# Patient Record
Sex: MALE | Race: WHITE | NOT HISPANIC OR LATINO | Employment: OTHER | URBAN - METROPOLITAN AREA
[De-identification: names, ages, dates, MRNs, and addresses within clinical notes are randomized per-mention and may not be internally consistent; named-entity substitution may affect disease eponyms.]

---

## 2019-08-14 ENCOUNTER — HOSPITAL ENCOUNTER (OUTPATIENT)
Dept: OTHER | Facility: HOSPITAL | Age: 59
Discharge: HOME | End: 2019-08-14
Attending: FAMILY MEDICINE

## 2019-08-14 ENCOUNTER — OFFICE VISIT CONVERTED (OUTPATIENT)
Dept: FAMILY MEDICINE CLINIC | Age: 59
End: 2019-08-14
Attending: FAMILY MEDICINE

## 2019-08-14 LAB
ALBUMIN SERPL-MCNC: 4.1 G/DL (ref 3.5–5)
ALBUMIN/GLOB SERPL: 1.5 {RATIO} (ref 1.4–2.6)
ALP SERPL-CCNC: 145 U/L (ref 56–119)
ALT SERPL-CCNC: 13 U/L (ref 10–40)
ANION GAP SERPL CALC-SCNC: 19 MMOL/L (ref 8–19)
AST SERPL-CCNC: 12 U/L (ref 15–50)
BASOPHILS # BLD MANUAL: 0.07 10*3/UL (ref 0–0.2)
BASOPHILS NFR BLD MANUAL: 0.7 % (ref 0–3)
BILIRUB SERPL-MCNC: 0.36 MG/DL (ref 0.2–1.3)
BUN SERPL-MCNC: 14 MG/DL (ref 5–25)
BUN/CREAT SERPL: 17 {RATIO} (ref 6–20)
CALCIUM SERPL-MCNC: 9.7 MG/DL (ref 8.7–10.4)
CHLORIDE SERPL-SCNC: 105 MMOL/L (ref 99–111)
CHOLEST SERPL-MCNC: 214 MG/DL (ref 107–200)
CHOLEST/HDLC SERPL: 6.1 {RATIO} (ref 3–6)
CONV CO2: 24 MMOL/L (ref 22–32)
CONV TOTAL PROTEIN: 6.9 G/DL (ref 6.3–8.2)
CREAT UR-MCNC: 0.83 MG/DL (ref 0.7–1.2)
DEPRECATED RDW RBC AUTO: 39.1 FL
EOSINOPHIL # BLD MANUAL: 0.94 10*3/UL (ref 0–0.7)
EOSINOPHIL NFR BLD MANUAL: 9 % (ref 0–7)
ERYTHROCYTE [DISTWIDTH] IN BLOOD BY AUTOMATED COUNT: 13 % (ref 11.5–14.5)
GFR SERPLBLD BASED ON 1.73 SQ M-ARVRAT: >60 ML/MIN/{1.73_M2}
GLOBULIN UR ELPH-MCNC: 2.8 G/DL (ref 2–3.5)
GLUCOSE SERPL-MCNC: 248 MG/DL (ref 70–99)
GRANS (ABSOLUTE): 6.93 10*3/UL (ref 2–8)
GRANS: 66.1 % (ref 30–85)
HBA1C MFR BLD: 16.3 G/DL (ref 14–18)
HCT VFR BLD AUTO: 47 % (ref 42–52)
HDLC SERPL-MCNC: 35 MG/DL (ref 40–60)
IMM GRANULOCYTES # BLD: 0.02 10*3/UL (ref 0–0.54)
IMM GRANULOCYTES NFR BLD: 0.2 % (ref 0–0.43)
LDLC SERPL CALC-MCNC: 154 MG/DL (ref 70–100)
LYMPHOCYTES # BLD MANUAL: 2.03 10*3/UL (ref 1–5)
LYMPHOCYTES NFR BLD MANUAL: 4.6 % (ref 3–10)
MCH RBC QN AUTO: 28.5 PG (ref 27–31)
MCHC RBC AUTO-ENTMCNC: 34.7 G/DL (ref 33–37)
MCV RBC AUTO: 82.3 FL (ref 80–96)
MONOCYTES # BLD AUTO: 0.48 10*3/UL (ref 0.2–1.2)
OSMOLALITY SERPL CALC.SUM OF ELEC: 305 MOSM/KG (ref 273–304)
PLATELET # BLD AUTO: 272 10*3/UL (ref 130–400)
PMV BLD AUTO: 8.9 FL (ref 7.4–10.4)
POTASSIUM SERPL-SCNC: 4.7 MMOL/L (ref 3.5–5.3)
PSA SERPL-MCNC: 4.13 NG/ML (ref 0–4)
RBC # BLD AUTO: 5.71 10*6/UL (ref 4.7–6.1)
SODIUM SERPL-SCNC: 143 MMOL/L (ref 135–147)
TRIGL SERPL-MCNC: 123 MG/DL (ref 40–150)
TSH SERPL-ACNC: 1.79 M[IU]/L (ref 0.27–4.2)
VARIANT LYMPHS NFR BLD MANUAL: 19.4 % (ref 20–45)
VLDLC SERPL-MCNC: 25 MG/DL (ref 5–37)
WBC # BLD AUTO: 10.47 10*3/UL (ref 4.8–10.8)

## 2019-08-16 LAB
EST. AVERAGE GLUCOSE BLD GHB EST-MCNC: 194 MG/DL
HBA1C MFR BLD: 8.4 % (ref 3.5–5.7)

## 2019-08-21 ENCOUNTER — OFFICE VISIT CONVERTED (OUTPATIENT)
Dept: FAMILY MEDICINE CLINIC | Age: 59
End: 2019-08-21
Attending: FAMILY MEDICINE

## 2019-08-28 ENCOUNTER — HOSPITAL ENCOUNTER (OUTPATIENT)
Dept: OTHER | Facility: HOSPITAL | Age: 59
Discharge: HOME | End: 2019-08-28
Attending: FAMILY MEDICINE

## 2019-08-28 ENCOUNTER — OFFICE VISIT CONVERTED (OUTPATIENT)
Dept: FAMILY MEDICINE CLINIC | Age: 59
End: 2019-08-28
Attending: FAMILY MEDICINE

## 2019-08-28 LAB
ALBUMIN SERPL-MCNC: 4.3 G/DL (ref 3.5–5)
ALBUMIN/GLOB SERPL: 1.6 {RATIO} (ref 1.4–2.6)
ALP SERPL-CCNC: 143 U/L (ref 56–119)
ALT SERPL-CCNC: 15 U/L (ref 10–40)
ANION GAP SERPL CALC-SCNC: 19 MMOL/L (ref 8–19)
AST SERPL-CCNC: 16 U/L (ref 15–50)
BASOPHILS # BLD MANUAL: 0.08 10*3/UL (ref 0–0.2)
BASOPHILS NFR BLD MANUAL: 0.8 % (ref 0–3)
BILIRUB SERPL-MCNC: 0.31 MG/DL (ref 0.2–1.3)
BUN SERPL-MCNC: 16 MG/DL (ref 5–25)
BUN/CREAT SERPL: 19 {RATIO} (ref 6–20)
CALCIUM SERPL-MCNC: 9.8 MG/DL (ref 8.7–10.4)
CHLORIDE SERPL-SCNC: 106 MMOL/L (ref 99–111)
CONV CO2: 23 MMOL/L (ref 22–32)
CONV TOTAL PROTEIN: 7 G/DL (ref 6.3–8.2)
CREAT UR-MCNC: 0.83 MG/DL (ref 0.7–1.2)
DEPRECATED RDW RBC AUTO: 39.9 FL
EOSINOPHIL # BLD MANUAL: 1.04 10*3/UL (ref 0–0.7)
EOSINOPHIL NFR BLD MANUAL: 10.7 % (ref 0–7)
ERYTHROCYTE [DISTWIDTH] IN BLOOD BY AUTOMATED COUNT: 13 % (ref 11.5–14.5)
GFR SERPLBLD BASED ON 1.73 SQ M-ARVRAT: >60 ML/MIN/{1.73_M2}
GLOBULIN UR ELPH-MCNC: 2.7 G/DL (ref 2–3.5)
GLUCOSE SERPL-MCNC: 149 MG/DL (ref 70–99)
GRANS (ABSOLUTE): 5.97 10*3/UL (ref 2–8)
GRANS: 61.4 % (ref 30–85)
HBA1C MFR BLD: 16.4 G/DL (ref 14–18)
HCT VFR BLD AUTO: 48.1 % (ref 42–52)
IMM GRANULOCYTES # BLD: 0.02 10*3/UL (ref 0–0.54)
IMM GRANULOCYTES NFR BLD: 0.2 % (ref 0–0.43)
LYMPHOCYTES # BLD MANUAL: 2.15 10*3/UL (ref 1–5)
LYMPHOCYTES NFR BLD MANUAL: 4.8 % (ref 3–10)
MCH RBC QN AUTO: 28.3 PG (ref 27–31)
MCHC RBC AUTO-ENTMCNC: 34.1 G/DL (ref 33–37)
MCV RBC AUTO: 82.9 FL (ref 80–96)
MONOCYTES # BLD AUTO: 0.47 10*3/UL (ref 0.2–1.2)
OSMOLALITY SERPL CALC.SUM OF ELEC: 302 MOSM/KG (ref 273–304)
PLATELET # BLD AUTO: 301 10*3/UL (ref 130–400)
PMV BLD AUTO: 9.1 FL (ref 7.4–10.4)
POTASSIUM SERPL-SCNC: 4.4 MMOL/L (ref 3.5–5.3)
RBC # BLD AUTO: 5.8 10*6/UL (ref 4.7–6.1)
SODIUM SERPL-SCNC: 144 MMOL/L (ref 135–147)
VARIANT LYMPHS NFR BLD MANUAL: 22.1 % (ref 20–45)
WBC # BLD AUTO: 9.73 10*3/UL (ref 4.8–10.8)

## 2019-08-30 ENCOUNTER — HOSPITAL ENCOUNTER (OUTPATIENT)
Dept: OTHER | Facility: HOSPITAL | Age: 59
Discharge: HOME | End: 2019-08-30
Attending: FAMILY MEDICINE

## 2019-09-04 ENCOUNTER — OFFICE VISIT CONVERTED (OUTPATIENT)
Dept: FAMILY MEDICINE CLINIC | Age: 59
End: 2019-09-04
Attending: FAMILY MEDICINE

## 2019-09-04 ENCOUNTER — HOSPITAL ENCOUNTER (OUTPATIENT)
Dept: OTHER | Facility: HOSPITAL | Age: 59
Discharge: HOME | End: 2019-09-04
Attending: FAMILY MEDICINE

## 2019-09-12 ENCOUNTER — HOSPITAL ENCOUNTER (OUTPATIENT)
Dept: OTHER | Facility: HOSPITAL | Age: 59
Discharge: HOME | End: 2019-09-12
Attending: FAMILY MEDICINE

## 2019-09-12 ENCOUNTER — OFFICE VISIT CONVERTED (OUTPATIENT)
Dept: FAMILY MEDICINE CLINIC | Age: 59
End: 2019-09-12
Attending: FAMILY MEDICINE

## 2019-09-12 LAB
ALBUMIN SERPL-MCNC: 4 G/DL (ref 3.5–5)
ALBUMIN/GLOB SERPL: 1.4 {RATIO} (ref 1.4–2.6)
ALP SERPL-CCNC: 161 U/L (ref 56–119)
ALT SERPL-CCNC: 13 U/L (ref 10–40)
ANION GAP SERPL CALC-SCNC: 18 MMOL/L (ref 8–19)
AST SERPL-CCNC: 14 U/L (ref 15–50)
BASOPHILS # BLD MANUAL: 0.08 10*3/UL (ref 0–0.2)
BASOPHILS NFR BLD MANUAL: 0.7 % (ref 0–3)
BILIRUB SERPL-MCNC: 0.44 MG/DL (ref 0.2–1.3)
BUN SERPL-MCNC: 17 MG/DL (ref 5–25)
BUN/CREAT SERPL: 22 {RATIO} (ref 6–20)
CALCIUM SERPL-MCNC: 9.6 MG/DL (ref 8.7–10.4)
CHLORIDE SERPL-SCNC: 105 MMOL/L (ref 99–111)
CONV CO2: 24 MMOL/L (ref 22–32)
CONV TOTAL PROTEIN: 6.9 G/DL (ref 6.3–8.2)
CREAT UR-MCNC: 0.78 MG/DL (ref 0.7–1.2)
DEPRECATED RDW RBC AUTO: 39.1 FL
EOSINOPHIL # BLD MANUAL: 0.98 10*3/UL (ref 0–0.7)
EOSINOPHIL NFR BLD MANUAL: 8.5 % (ref 0–7)
ERYTHROCYTE [DISTWIDTH] IN BLOOD BY AUTOMATED COUNT: 12.4 % (ref 11.5–14.5)
GFR SERPLBLD BASED ON 1.73 SQ M-ARVRAT: >60 ML/MIN/{1.73_M2}
GLOBULIN UR ELPH-MCNC: 2.9 G/DL (ref 2–3.5)
GLUCOSE SERPL-MCNC: 120 MG/DL (ref 70–99)
GRANS (ABSOLUTE): 7.65 10*3/UL (ref 2–8)
GRANS: 66.4 % (ref 30–85)
HBA1C MFR BLD: 14.4 G/DL (ref 14–18)
HCT VFR BLD AUTO: 43 % (ref 42–52)
IMM GRANULOCYTES # BLD: 0.02 10*3/UL (ref 0–0.54)
IMM GRANULOCYTES NFR BLD: 0.2 % (ref 0–0.43)
LYMPHOCYTES # BLD MANUAL: 2.17 10*3/UL (ref 1–5)
LYMPHOCYTES NFR BLD MANUAL: 5.3 % (ref 3–10)
MCH RBC QN AUTO: 28.4 PG (ref 27–31)
MCHC RBC AUTO-ENTMCNC: 33.5 G/DL (ref 33–37)
MCV RBC AUTO: 84.8 FL (ref 80–96)
MONOCYTES # BLD AUTO: 0.61 10*3/UL (ref 0.2–1.2)
OSMOLALITY SERPL CALC.SUM OF ELEC: 299 MOSM/KG (ref 273–304)
PLATELET # BLD AUTO: 338 10*3/UL (ref 130–400)
PMV BLD AUTO: 8.6 FL (ref 7.4–10.4)
POTASSIUM SERPL-SCNC: 4.3 MMOL/L (ref 3.5–5.3)
RBC # BLD AUTO: 5.07 10*6/UL (ref 4.7–6.1)
SODIUM SERPL-SCNC: 143 MMOL/L (ref 135–147)
VARIANT LYMPHS NFR BLD MANUAL: 18.9 % (ref 20–45)
WBC # BLD AUTO: 11.51 10*3/UL (ref 4.8–10.8)

## 2019-09-14 LAB
PSA FREE MFR SERPL: 17.9 %
PSA FREE SERPL-MCNC: 0.75 NG/ML
PSA SERPL-MCNC: 4.2 NG/ML (ref 0–4)

## 2019-09-17 ENCOUNTER — HOSPITAL ENCOUNTER (OUTPATIENT)
Dept: CARDIOLOGY | Facility: HOSPITAL | Age: 59
Discharge: HOME | End: 2019-09-17

## 2019-09-26 ENCOUNTER — OFFICE VISIT CONVERTED (OUTPATIENT)
Dept: FAMILY MEDICINE CLINIC | Age: 59
End: 2019-09-26
Attending: FAMILY MEDICINE

## 2019-10-25 ENCOUNTER — OFFICE VISIT CONVERTED (OUTPATIENT)
Dept: FAMILY MEDICINE CLINIC | Age: 59
End: 2019-10-25
Attending: FAMILY MEDICINE

## 2019-10-25 ENCOUNTER — HOSPITAL ENCOUNTER (OUTPATIENT)
Dept: OTHER | Facility: HOSPITAL | Age: 59
Discharge: HOME | End: 2019-10-25
Attending: FAMILY MEDICINE

## 2019-10-25 LAB
ALBUMIN SERPL-MCNC: 4.4 G/DL (ref 3.5–5)
ALBUMIN/GLOB SERPL: 1.5 {RATIO} (ref 1.4–2.6)
ALP SERPL-CCNC: 162 U/L (ref 56–119)
ALT SERPL-CCNC: 25 U/L (ref 10–40)
ANION GAP SERPL CALC-SCNC: 20 MMOL/L (ref 8–19)
AST SERPL-CCNC: 26 U/L (ref 15–50)
BASOPHILS # BLD MANUAL: 0.08 10*3/UL (ref 0–0.2)
BASOPHILS NFR BLD MANUAL: 0.7 % (ref 0–3)
BILIRUB SERPL-MCNC: 0.38 MG/DL (ref 0.2–1.3)
BUN SERPL-MCNC: 18 MG/DL (ref 5–25)
BUN/CREAT SERPL: 26 {RATIO} (ref 6–20)
CALCIUM SERPL-MCNC: 9.9 MG/DL (ref 8.7–10.4)
CHLORIDE SERPL-SCNC: 105 MMOL/L (ref 99–111)
CONV CO2: 22 MMOL/L (ref 22–32)
CONV TOTAL PROTEIN: 7.3 G/DL (ref 6.3–8.2)
CREAT UR-MCNC: 0.7 MG/DL (ref 0.7–1.2)
DEPRECATED RDW RBC AUTO: 42.6 FL
EOSINOPHIL # BLD MANUAL: 1.14 10*3/UL (ref 0–0.7)
EOSINOPHIL NFR BLD MANUAL: 10 % (ref 0–7)
ERYTHROCYTE [DISTWIDTH] IN BLOOD BY AUTOMATED COUNT: 13.2 % (ref 11.5–14.5)
GFR SERPLBLD BASED ON 1.73 SQ M-ARVRAT: >60 ML/MIN/{1.73_M2}
GLOBULIN UR ELPH-MCNC: 2.9 G/DL (ref 2–3.5)
GLUCOSE SERPL-MCNC: 116 MG/DL (ref 70–99)
GRANS (ABSOLUTE): 7.53 10*3/UL (ref 2–8)
GRANS: 66.2 % (ref 30–85)
HBA1C MFR BLD: 14.8 G/DL (ref 14–18)
HCT VFR BLD AUTO: 44.9 % (ref 42–52)
IMM GRANULOCYTES # BLD: 0.01 10*3/UL (ref 0–0.54)
IMM GRANULOCYTES NFR BLD: 0.1 % (ref 0–0.43)
LYMPHOCYTES # BLD MANUAL: 2.26 10*3/UL (ref 1–5)
LYMPHOCYTES NFR BLD MANUAL: 3.2 % (ref 3–10)
MCH RBC QN AUTO: 28.5 PG (ref 27–31)
MCHC RBC AUTO-ENTMCNC: 33 G/DL (ref 33–37)
MCV RBC AUTO: 86.5 FL (ref 80–96)
MONOCYTES # BLD AUTO: 0.37 10*3/UL (ref 0.2–1.2)
OSMOLALITY SERPL CALC.SUM OF ELEC: 299 MOSM/KG (ref 273–304)
PLATELET # BLD AUTO: 331 10*3/UL (ref 130–400)
PMV BLD AUTO: 8.8 FL (ref 7.4–10.4)
POTASSIUM SERPL-SCNC: 4.3 MMOL/L (ref 3.5–5.3)
RBC # BLD AUTO: 5.19 10*6/UL (ref 4.7–6.1)
SODIUM SERPL-SCNC: 143 MMOL/L (ref 135–147)
VARIANT LYMPHS NFR BLD MANUAL: 19.8 % (ref 20–45)
WBC # BLD AUTO: 11.39 10*3/UL (ref 4.8–10.8)

## 2019-11-15 ENCOUNTER — HOSPITAL ENCOUNTER (OUTPATIENT)
Dept: OTHER | Facility: HOSPITAL | Age: 59
Discharge: HOME | End: 2019-11-15
Attending: FAMILY MEDICINE

## 2019-11-15 ENCOUNTER — OFFICE VISIT CONVERTED (OUTPATIENT)
Dept: FAMILY MEDICINE CLINIC | Age: 59
End: 2019-11-15
Attending: FAMILY MEDICINE

## 2019-11-15 LAB
ALBUMIN SERPL-MCNC: 4.4 G/DL (ref 3.5–5)
ALBUMIN/GLOB SERPL: 1.6 {RATIO} (ref 1.4–2.6)
ALP SERPL-CCNC: 135 U/L (ref 56–119)
ALT SERPL-CCNC: 30 U/L (ref 10–40)
ANION GAP SERPL CALC-SCNC: 15 MMOL/L (ref 8–19)
AST SERPL-CCNC: 26 U/L (ref 15–50)
BASOPHILS # BLD MANUAL: 0.06 10*3/UL (ref 0–0.2)
BASOPHILS NFR BLD MANUAL: 0.6 % (ref 0–3)
BILIRUB SERPL-MCNC: 0.45 MG/DL (ref 0.2–1.3)
BUN SERPL-MCNC: 17 MG/DL (ref 5–25)
BUN/CREAT SERPL: 22 {RATIO} (ref 6–20)
CALCIUM SERPL-MCNC: 10.2 MG/DL (ref 8.7–10.4)
CHLORIDE SERPL-SCNC: 104 MMOL/L (ref 99–111)
CONV CO2: 26 MMOL/L (ref 22–32)
CONV CREATININE URINE, RANDOM: 61.6 MG/DL (ref 10–300)
CONV MICROALBUM.,U,RANDOM: 745.8 MG/L (ref 0–20)
CONV TOTAL PROTEIN: 7.2 G/DL (ref 6.3–8.2)
CREAT UR-MCNC: 0.77 MG/DL (ref 0.7–1.2)
DEPRECATED RDW RBC AUTO: 41.2 FL
EOSINOPHIL # BLD MANUAL: 1.02 10*3/UL (ref 0–0.7)
EOSINOPHIL NFR BLD MANUAL: 10.2 % (ref 0–7)
ERYTHROCYTE [DISTWIDTH] IN BLOOD BY AUTOMATED COUNT: 13.1 % (ref 11.5–14.5)
EST. AVERAGE GLUCOSE BLD GHB EST-MCNC: 148 MG/DL
GFR SERPLBLD BASED ON 1.73 SQ M-ARVRAT: >60 ML/MIN/{1.73_M2}
GLOBULIN UR ELPH-MCNC: 2.8 G/DL (ref 2–3.5)
GLUCOSE SERPL-MCNC: 85 MG/DL (ref 70–99)
GRANS (ABSOLUTE): 6.15 10*3/UL (ref 2–8)
GRANS: 61.7 % (ref 30–85)
HBA1C MFR BLD: 15.4 G/DL (ref 14–18)
HBA1C MFR BLD: 6.8 % (ref 3.5–5.7)
HCT VFR BLD AUTO: 45.4 % (ref 42–52)
IMM GRANULOCYTES # BLD: 0.01 10*3/UL (ref 0–0.54)
IMM GRANULOCYTES NFR BLD: 0.1 % (ref 0–0.43)
LYMPHOCYTES # BLD MANUAL: 2.25 10*3/UL (ref 1–5)
LYMPHOCYTES NFR BLD MANUAL: 4.9 % (ref 3–10)
MCH RBC QN AUTO: 28.9 PG (ref 27–31)
MCHC RBC AUTO-ENTMCNC: 33.9 G/DL (ref 33–37)
MCV RBC AUTO: 85.3 FL (ref 80–96)
MICROALBUMIN/CREAT UR: 1210.7 MG/G{CRE} (ref 0–25)
MONOCYTES # BLD AUTO: 0.49 10*3/UL (ref 0.2–1.2)
OSMOLALITY SERPL CALC.SUM OF ELEC: 293 MOSM/KG (ref 273–304)
PLATELET # BLD AUTO: 261 10*3/UL (ref 130–400)
PMV BLD AUTO: 8.9 FL (ref 7.4–10.4)
POTASSIUM SERPL-SCNC: 3.9 MMOL/L (ref 3.5–5.3)
RBC # BLD AUTO: 5.32 10*6/UL (ref 4.7–6.1)
SODIUM SERPL-SCNC: 141 MMOL/L (ref 135–147)
VARIANT LYMPHS NFR BLD MANUAL: 22.5 % (ref 20–45)
WBC # BLD AUTO: 9.98 10*3/UL (ref 4.8–10.8)

## 2019-12-31 ENCOUNTER — OFFICE VISIT CONVERTED (OUTPATIENT)
Dept: FAMILY MEDICINE CLINIC | Age: 59
End: 2019-12-31
Attending: FAMILY MEDICINE

## 2020-01-31 ENCOUNTER — OFFICE VISIT CONVERTED (OUTPATIENT)
Dept: FAMILY MEDICINE CLINIC | Age: 60
End: 2020-01-31
Attending: FAMILY MEDICINE

## 2020-02-14 ENCOUNTER — HOSPITAL ENCOUNTER (OUTPATIENT)
Dept: OTHER | Facility: HOSPITAL | Age: 60
Discharge: HOME | End: 2020-02-14
Attending: FAMILY MEDICINE

## 2020-02-14 LAB
ALBUMIN SERPL-MCNC: 4.2 G/DL (ref 3.5–5)
ALBUMIN/GLOB SERPL: 1.6 {RATIO} (ref 1.4–2.6)
ALP SERPL-CCNC: 138 U/L (ref 56–119)
ALT SERPL-CCNC: 30 U/L (ref 10–40)
ANION GAP SERPL CALC-SCNC: 17 MMOL/L (ref 8–19)
AST SERPL-CCNC: 23 U/L (ref 15–50)
BASOPHILS # BLD MANUAL: 0.07 10*3/UL (ref 0–0.2)
BASOPHILS NFR BLD MANUAL: 0.6 % (ref 0–3)
BILIRUB SERPL-MCNC: 0.35 MG/DL (ref 0.2–1.3)
BUN SERPL-MCNC: 14 MG/DL (ref 5–25)
BUN/CREAT SERPL: 18 {RATIO} (ref 6–20)
CALCIUM SERPL-MCNC: 9.7 MG/DL (ref 8.7–10.4)
CHLORIDE SERPL-SCNC: 106 MMOL/L (ref 99–111)
CHOLEST SERPL-MCNC: 122 MG/DL (ref 107–200)
CHOLEST/HDLC SERPL: 2.9 {RATIO} (ref 3–6)
CONV CO2: 24 MMOL/L (ref 22–32)
CONV CREATININE URINE, RANDOM: 71.7 MG/DL (ref 10–300)
CONV MICROALBUM.,U,RANDOM: 1250.4 MG/L (ref 0–20)
CONV TOTAL PROTEIN: 6.8 G/DL (ref 6.3–8.2)
CREAT UR-MCNC: 0.77 MG/DL (ref 0.7–1.2)
DEPRECATED RDW RBC AUTO: 40.1 FL
EOSINOPHIL # BLD MANUAL: 0.9 10*3/UL (ref 0–0.7)
EOSINOPHIL NFR BLD MANUAL: 7.5 % (ref 0–7)
ERYTHROCYTE [DISTWIDTH] IN BLOOD BY AUTOMATED COUNT: 12.8 % (ref 11.5–14.5)
EST. AVERAGE GLUCOSE BLD GHB EST-MCNC: 160 MG/DL
GFR SERPLBLD BASED ON 1.73 SQ M-ARVRAT: >60 ML/MIN/{1.73_M2}
GLOBULIN UR ELPH-MCNC: 2.6 G/DL (ref 2–3.5)
GLUCOSE SERPL-MCNC: 135 MG/DL (ref 70–99)
GRANS (ABSOLUTE): 8.08 10*3/UL (ref 2–8)
GRANS: 67.4 % (ref 30–85)
HBA1C MFR BLD: 14.4 G/DL (ref 14–18)
HBA1C MFR BLD: 7.2 % (ref 3.5–5.7)
HCT VFR BLD AUTO: 41.4 % (ref 42–52)
HDLC SERPL-MCNC: 42 MG/DL (ref 40–60)
IMM GRANULOCYTES # BLD: 0.04 10*3/UL (ref 0–0.54)
IMM GRANULOCYTES NFR BLD: 0.3 % (ref 0–0.43)
LDLC SERPL CALC-MCNC: 69 MG/DL (ref 70–100)
LYMPHOCYTES # BLD MANUAL: 2.39 10*3/UL (ref 1–5)
LYMPHOCYTES NFR BLD MANUAL: 4.3 % (ref 3–10)
MCH RBC QN AUTO: 29.9 PG (ref 27–31)
MCHC RBC AUTO-ENTMCNC: 34.8 G/DL (ref 33–37)
MCV RBC AUTO: 85.9 FL (ref 80–96)
MICROALBUMIN/CREAT UR: 1743.9 MG/G{CRE} (ref 0–25)
MONOCYTES # BLD AUTO: 0.52 10*3/UL (ref 0.2–1.2)
OSMOLALITY SERPL CALC.SUM OF ELEC: 297 MOSM/KG (ref 273–304)
PLATELET # BLD AUTO: 266 10*3/UL (ref 130–400)
PMV BLD AUTO: 8.9 FL (ref 7.4–10.4)
POTASSIUM SERPL-SCNC: 4.9 MMOL/L (ref 3.5–5.3)
RBC # BLD AUTO: 4.82 10*6/UL (ref 4.7–6.1)
SODIUM SERPL-SCNC: 142 MMOL/L (ref 135–147)
TRIGL SERPL-MCNC: 54 MG/DL (ref 40–150)
TSH SERPL-ACNC: 1.88 M[IU]/L (ref 0.27–4.2)
VARIANT LYMPHS NFR BLD MANUAL: 19.9 % (ref 20–45)
VLDLC SERPL-MCNC: 11 MG/DL (ref 5–37)
WBC # BLD AUTO: 12 10*3/UL (ref 4.8–10.8)

## 2020-02-18 ENCOUNTER — OFFICE VISIT CONVERTED (OUTPATIENT)
Dept: FAMILY MEDICINE CLINIC | Age: 60
End: 2020-02-18
Attending: FAMILY MEDICINE

## 2020-03-05 ENCOUNTER — OFFICE VISIT CONVERTED (OUTPATIENT)
Dept: PODIATRY | Facility: CLINIC | Age: 60
End: 2020-03-05
Attending: PODIATRIST

## 2020-05-27 ENCOUNTER — HOSPITAL ENCOUNTER (OUTPATIENT)
Dept: OTHER | Facility: HOSPITAL | Age: 60
Discharge: HOME | End: 2020-05-27
Attending: FAMILY MEDICINE

## 2020-05-27 ENCOUNTER — OFFICE VISIT CONVERTED (OUTPATIENT)
Dept: FAMILY MEDICINE CLINIC | Age: 60
End: 2020-05-27
Attending: FAMILY MEDICINE

## 2020-05-27 LAB
ALBUMIN SERPL-MCNC: 4.1 G/DL (ref 3.5–5)
ALBUMIN/GLOB SERPL: 1.6 {RATIO} (ref 1.4–2.6)
ALP SERPL-CCNC: 118 U/L (ref 56–119)
ALT SERPL-CCNC: 17 U/L (ref 10–40)
ANION GAP SERPL CALC-SCNC: 15 MMOL/L (ref 8–19)
AST SERPL-CCNC: 14 U/L (ref 15–50)
BASOPHILS # BLD MANUAL: 0.08 10*3/UL (ref 0–0.2)
BASOPHILS NFR BLD MANUAL: 0.7 % (ref 0–3)
BILIRUB SERPL-MCNC: 0.29 MG/DL (ref 0.2–1.3)
BUN SERPL-MCNC: 17 MG/DL (ref 5–25)
BUN/CREAT SERPL: 21 {RATIO} (ref 6–20)
CALCIUM SERPL-MCNC: 9.4 MG/DL (ref 8.7–10.4)
CHLORIDE SERPL-SCNC: 105 MMOL/L (ref 99–111)
CONV CO2: 23 MMOL/L (ref 22–32)
CONV TOTAL PROTEIN: 6.6 G/DL (ref 6.3–8.2)
CREAT UR-MCNC: 0.82 MG/DL (ref 0.7–1.2)
DEPRECATED RDW RBC AUTO: 39.6 FL
EOSINOPHIL # BLD MANUAL: 0.86 10*3/UL (ref 0–0.7)
EOSINOPHIL NFR BLD MANUAL: 7.8 % (ref 0–7)
ERYTHROCYTE [DISTWIDTH] IN BLOOD BY AUTOMATED COUNT: 12.4 % (ref 11.5–14.5)
EST. AVERAGE GLUCOSE BLD GHB EST-MCNC: 163 MG/DL
GFR SERPLBLD BASED ON 1.73 SQ M-ARVRAT: >60 ML/MIN/{1.73_M2}
GLOBULIN UR ELPH-MCNC: 2.5 G/DL (ref 2–3.5)
GLUCOSE SERPL-MCNC: 87 MG/DL (ref 70–99)
GRANS (ABSOLUTE): 7.43 10*3/UL (ref 2–8)
GRANS: 67.5 % (ref 30–85)
HBA1C MFR BLD: 13.6 G/DL (ref 14–18)
HBA1C MFR BLD: 7.3 % (ref 3.5–5.7)
HCT VFR BLD AUTO: 39.5 % (ref 42–52)
IMM GRANULOCYTES # BLD: 0.03 10*3/UL (ref 0–0.54)
IMM GRANULOCYTES NFR BLD: 0.3 % (ref 0–0.43)
LYMPHOCYTES # BLD MANUAL: 2.05 10*3/UL (ref 1–5)
LYMPHOCYTES NFR BLD MANUAL: 5.1 % (ref 3–10)
MCH RBC QN AUTO: 29.8 PG (ref 27–31)
MCHC RBC AUTO-ENTMCNC: 34.4 G/DL (ref 33–37)
MCV RBC AUTO: 86.4 FL (ref 80–96)
MONOCYTES # BLD AUTO: 0.56 10*3/UL (ref 0.2–1.2)
OSMOLALITY SERPL CALC.SUM OF ELEC: 289 MOSM/KG (ref 273–304)
PLATELET # BLD AUTO: 274 10*3/UL (ref 130–400)
PMV BLD AUTO: 8.5 FL (ref 7.4–10.4)
POTASSIUM SERPL-SCNC: 3.9 MMOL/L (ref 3.5–5.3)
RBC # BLD AUTO: 4.57 10*6/UL (ref 4.7–6.1)
SODIUM SERPL-SCNC: 139 MMOL/L (ref 135–147)
TSH SERPL-ACNC: 1.64 M[IU]/L (ref 0.27–4.2)
VARIANT LYMPHS NFR BLD MANUAL: 18.6 % (ref 20–45)
WBC # BLD AUTO: 11.01 10*3/UL (ref 4.8–10.8)

## 2020-05-28 LAB
PSA FREE MFR SERPL: 31 %
PSA FREE SERPL-MCNC: 2.14 NG/ML
PSA SERPL-MCNC: 6.9 NG/ML (ref 0–4)

## 2020-10-30 ENCOUNTER — OFFICE VISIT CONVERTED (OUTPATIENT)
Dept: FAMILY MEDICINE CLINIC | Age: 60
End: 2020-10-30
Attending: FAMILY MEDICINE

## 2020-11-02 ENCOUNTER — HOSPITAL ENCOUNTER (OUTPATIENT)
Dept: OTHER | Facility: HOSPITAL | Age: 60
Discharge: HOME | End: 2020-11-02
Attending: FAMILY MEDICINE

## 2020-11-02 LAB
ALBUMIN SERPL-MCNC: 4.6 G/DL (ref 3.5–5)
ALBUMIN/GLOB SERPL: 1.8 {RATIO} (ref 1.4–2.6)
ALP SERPL-CCNC: 123 U/L (ref 56–155)
ALT SERPL-CCNC: 15 U/L (ref 10–40)
ANION GAP SERPL CALC-SCNC: 17 MMOL/L (ref 8–19)
AST SERPL-CCNC: 16 U/L (ref 15–50)
BASOPHILS # BLD AUTO: 0.11 10*3/UL (ref 0–0.2)
BASOPHILS NFR BLD AUTO: 0.8 % (ref 0–3)
BILIRUB SERPL-MCNC: 0.31 MG/DL (ref 0.2–1.3)
BUN SERPL-MCNC: 24 MG/DL (ref 5–25)
BUN/CREAT SERPL: 21 {RATIO} (ref 6–20)
CALCIUM SERPL-MCNC: 10.3 MG/DL (ref 8.7–10.4)
CHLORIDE SERPL-SCNC: 101 MMOL/L (ref 99–111)
CHOLEST SERPL-MCNC: 243 MG/DL (ref 107–200)
CHOLEST/HDLC SERPL: 5.2 {RATIO} (ref 3–6)
CONV ABS IMM GRAN: 0.04 10*3/UL (ref 0–0.2)
CONV CO2: 26 MMOL/L (ref 22–32)
CONV CREATININE URINE, RANDOM: 33.2 MG/DL (ref 10–300)
CONV IMMATURE GRAN: 0.3 % (ref 0–1.8)
CONV MICROALBUM.,U,RANDOM: 695.1 MG/L (ref 0–20)
CONV TOTAL PROTEIN: 7.2 G/DL (ref 6.3–8.2)
CREAT UR-MCNC: 1.16 MG/DL (ref 0.7–1.2)
DEPRECATED RDW RBC AUTO: 40.1 FL (ref 35.1–43.9)
EOSINOPHIL # BLD AUTO: 0.92 10*3/UL (ref 0–0.7)
EOSINOPHIL # BLD AUTO: 7.1 % (ref 0–7)
ERYTHROCYTE [DISTWIDTH] IN BLOOD BY AUTOMATED COUNT: 13.1 % (ref 11.6–14.4)
EST. AVERAGE GLUCOSE BLD GHB EST-MCNC: 200 MG/DL
GFR SERPLBLD BASED ON 1.73 SQ M-ARVRAT: >60 ML/MIN/{1.73_M2}
GLOBULIN UR ELPH-MCNC: 2.6 G/DL (ref 2–3.5)
GLUCOSE SERPL-MCNC: 194 MG/DL (ref 70–99)
HBA1C MFR BLD: 8.6 % (ref 3.5–5.7)
HCT VFR BLD AUTO: 49.8 % (ref 42–52)
HDLC SERPL-MCNC: 47 MG/DL (ref 40–60)
HGB BLD-MCNC: 16.6 G/DL (ref 14–18)
LDLC SERPL CALC-MCNC: 174 MG/DL (ref 70–100)
LYMPHOCYTES # BLD AUTO: 2.14 10*3/UL (ref 1–5)
LYMPHOCYTES NFR BLD AUTO: 16.4 % (ref 20–45)
MCH RBC QN AUTO: 28.7 PG (ref 27–31)
MCHC RBC AUTO-ENTMCNC: 33.3 G/DL (ref 33–37)
MCV RBC AUTO: 86 FL (ref 80–96)
MICROALBUMIN/CREAT UR: 2093.7 MG/G{CRE} (ref 0–25)
MONOCYTES # BLD AUTO: 0.53 10*3/UL (ref 0.2–1.2)
MONOCYTES NFR BLD AUTO: 4.1 % (ref 3–10)
NEUTROPHILS # BLD AUTO: 9.28 10*3/UL (ref 2–8)
NEUTROPHILS NFR BLD AUTO: 71.3 % (ref 30–85)
NRBC CBCN: 0 % (ref 0–0.7)
OSMOLALITY SERPL CALC.SUM OF ELEC: 297 MOSM/KG (ref 273–304)
PLATELET # BLD AUTO: 311 10*3/UL (ref 130–400)
PMV BLD AUTO: 9 FL (ref 9.4–12.4)
POTASSIUM SERPL-SCNC: 5 MMOL/L (ref 3.5–5.3)
RBC # BLD AUTO: 5.79 10*6/UL (ref 4.7–6.1)
SODIUM SERPL-SCNC: 139 MMOL/L (ref 135–147)
TRIGL SERPL-MCNC: 109 MG/DL (ref 40–150)
TSH SERPL-ACNC: 2.01 M[IU]/L (ref 0.27–4.2)
VLDLC SERPL-MCNC: 22 MG/DL (ref 5–37)
WBC # BLD AUTO: 13.02 10*3/UL (ref 4.8–10.8)

## 2021-02-17 ENCOUNTER — OFFICE VISIT CONVERTED (OUTPATIENT)
Dept: FAMILY MEDICINE CLINIC | Age: 61
End: 2021-02-17
Attending: FAMILY MEDICINE

## 2021-02-17 ENCOUNTER — HOSPITAL ENCOUNTER (OUTPATIENT)
Dept: OTHER | Facility: HOSPITAL | Age: 61
Discharge: HOME | End: 2021-02-17
Attending: FAMILY MEDICINE

## 2021-02-17 LAB
ALBUMIN SERPL-MCNC: 4.4 G/DL (ref 3.5–5)
ALBUMIN/GLOB SERPL: 1.8 {RATIO} (ref 1.4–2.6)
ALP SERPL-CCNC: 126 U/L (ref 56–155)
ALT SERPL-CCNC: 16 U/L (ref 10–40)
ANION GAP SERPL CALC-SCNC: 16 MMOL/L (ref 8–19)
AST SERPL-CCNC: 15 U/L (ref 15–50)
BILIRUB SERPL-MCNC: 0.36 MG/DL (ref 0.2–1.3)
BUN SERPL-MCNC: 27 MG/DL (ref 5–25)
BUN/CREAT SERPL: 25 {RATIO} (ref 6–20)
CALCIUM SERPL-MCNC: 9.6 MG/DL (ref 8.7–10.4)
CHLORIDE SERPL-SCNC: 102 MMOL/L (ref 99–111)
CHOLEST SERPL-MCNC: 207 MG/DL (ref 107–200)
CHOLEST/HDLC SERPL: 5 {RATIO} (ref 3–6)
CONV CO2: 24 MMOL/L (ref 22–32)
CONV TOTAL PROTEIN: 6.8 G/DL (ref 6.3–8.2)
CREAT UR-MCNC: 1.1 MG/DL (ref 0.7–1.2)
EST. AVERAGE GLUCOSE BLD GHB EST-MCNC: 169 MG/DL
GFR SERPLBLD BASED ON 1.73 SQ M-ARVRAT: >60 ML/MIN/{1.73_M2}
GLOBULIN UR ELPH-MCNC: 2.4 G/DL (ref 2–3.5)
GLUCOSE SERPL-MCNC: 158 MG/DL (ref 70–99)
HBA1C MFR BLD: 7.5 % (ref 3.5–5.7)
HDLC SERPL-MCNC: 41 MG/DL (ref 40–60)
LDLC SERPL CALC-MCNC: 142 MG/DL (ref 70–100)
OSMOLALITY SERPL CALC.SUM OF ELEC: 292 MOSM/KG (ref 273–304)
POTASSIUM SERPL-SCNC: 5 MMOL/L (ref 3.5–5.3)
SODIUM SERPL-SCNC: 137 MMOL/L (ref 135–147)
TRIGL SERPL-MCNC: 121 MG/DL (ref 40–150)
VLDLC SERPL-MCNC: 24 MG/DL (ref 5–37)

## 2021-04-01 ENCOUNTER — OFFICE VISIT CONVERTED (OUTPATIENT)
Dept: PODIATRY | Facility: CLINIC | Age: 61
End: 2021-04-01
Attending: PODIATRIST

## 2021-05-14 VITALS
WEIGHT: 190 LBS | DIASTOLIC BLOOD PRESSURE: 57 MMHG | OXYGEN SATURATION: 99 % | SYSTOLIC BLOOD PRESSURE: 142 MMHG | HEART RATE: 71 BPM | BODY MASS INDEX: 25.18 KG/M2 | HEIGHT: 73 IN

## 2021-05-14 NOTE — PROGRESS NOTES
Progress Note      Patient Name: Cleve Ibarra   Patient ID: [id number]   Sex: Male   YOB: 1960    Primary Care Provider: Renato Dubois MD   Referring Provider: Renato Dubois MD    Visit Date: April 1, 2021    Provider: Reid Garcia DPM   Location: [facility]   Location Address: [address] [place] [address]  [address] [place] [address]  [id number]   Location Phone: [phone]          Chief Complaint  · Diabetic Foot Evaluation      History Of Present Illness  Cleve Ibarra presents to the office today as a Follow-Up for a diabetic foot evaluation.   Patient reports that he is a diabetic currently controlling diabetes with oral medication      New, Established, New Problem:  est  Location:  bilateral feet  Duration:  10 years  Onset:  gradual  Nature:  NIDDM  Stable, worsening, improving:  stable  Aggravating factors:  Previous Treatment:  oral medication  Patient states their most recent blood glucose reading was 8.6.    Patient denies any fevers, chills, nausea, vomiting, shortness of breathe, nor any other constitutional signs nor symptoms.      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       Past Medical History  Arthritis; Corns and callus; Diabetes type 2, controlled; Heel pain; High cholesterol; Hypertension; Numbness in feet; Ulcer Of Foot         Past Surgical History  Fracture Repair         Medication List  amlodipine 10 mg oral tablet; atorvastatin 20 mg oral tablet; buspirone 7.5 mg oral tablet; hydrochlorothiazide 25 mg oral tablet; lisinopril 40 mg oral tablet; metformin 850 mg oral tablet; sildenafil 50 mg oral tablet; Valium 5 mg oral tablet         Allergy List  clonidine HCl; fluoxetine; sertraline; venlafaxine       Allergies Reconciled  Family Medical History  Family history of diabetes mellitus         Social History  Alcohol (Never); Tobacco (Current every day)         Review of Systems  · Constitutional  o Denies  o :  "fatigue, night sweats  · Eyes  o Denies  o : double vision, blurred vision  · HENT  o Denies  o : vertigo, recent head injury  · Cardiovascular  o Denies  o : chest pain, irregular heart beats  · Respiratory  o Denies  o : shortness of breath, productive cough  · Gastrointestinal  o Denies  o : nausea, vomiting  · Genitourinary  o Denies  o : dysuria, urinary retention  · Integument  o Denies  o : hair growth change, new skin lesions  · Neurologic  o * See HPI  · Musculoskeletal  o Denies  o : joint swelling, limitation of motion  · Endocrine  o Denies  o : cold intolerance, heat intolerance  · Heme-Lymph  o Denies  o : petechiae, lymph node enlargement or tenderness  · Allergic-Immunologic  o Denies  o : frequent illnesses      Vitals  Date Time BP Position Site L\R Cuff Size HR RR TEMP (F) WT  HT  BMI kg/m2 BSA m2 O2 Sat FR L/min FiO2 HC       04/01/2021 01:19 /57 Sitting    71 - R   190lbs 0oz 6'  1\" 25.07 2.11 99 %            Physical Examination  · Constitutional  o Appearance  o : well-nourished, well developed, no obvious deformities present, appears to be chronically ill   · Cardiovascular  o Peripheral Vascular System  o :   § Extremities  § : no edema noted  · Musculoskeletal  o Extremeties/Joint  o : Lower extremity muscle strength and range of motion is equal and symmetrical bilaterally. The knees are noted to be in normal alignment. Ankle alignment and range of motion is normal and foot structure is normal. Subtalar, metatarsal and metatarsal-phalangeal joint range of motion is noted to be within normal limits. The digits of both feet are in normal alignment. The gait is normal.  · Left DM Foot Exam  o Sensation  o : [place]-Henna 5.07 monofilament diminished to all assessed areas. Sharp/dull sensation is decreased.   o Visual Inspection  o : Skin is noted to have normal texture and turgor, with no excrescences noted. The toenails are noted to be without disease  o Vascular  o : Nonpalpable " dorsalis pedis and posterir tibialis pulses present, normal capillary refill  · Right DM Foot Exam  o Sensation  o : [place]-Henna 5.07 monofilament diminished to all assessed areas. Sharp/dull sensation is decreased.   o Visual Inspection  o : Skin is noted to have normal texture and turgor, with no excrescences noted. The toenails are noted to be without disease  o Vascular  o : Nonpalpable dorsalis pedis and posterir tibialis pulses present, normal capillary refill          Assessment  · Diabetes     250.00/E11.9  · Foot pain, left     729.5/M79.672  · Foot pain, right     729.5/M79.671  · Peripheral vascular disease     443.9/I73.9  · Neuropathy     355.9/G62.9      Plan  · Orders  o Diabetic Foot (Motor and Sensory) Exam Completed [facility] (, , 2028F) - - 04/01/2021  · Medications  o Medications have been Reconciled  o Transition of Care or Provider Policy  · Instructions  o Follow Up in 1 year for diabetic foot evaluation  o I have discussed the findings of this evaluation with the patient. The discussion included a complete verbal explanation of any changes in the examination results, diagnosis, and the current treatment plan. A schedule for future care needs was explained. If any questions should arise after returning home, I have encouraged the patient to feel free to contact Dr. Garcia. The patient states understanding and agreement with this plan.   o Pt to monitor for problems and to contact Dr. Garcia for follow-up should such signs occur. Patient states understanding and agreement with this plan.   o Prescription for diabetic shoes was dispensed along with list of the stores that carry these.  o Diabetic foot exam performed and documented this date, compliant with CQM required standards. Detail of findings as noted in physical exam. Lower extremity Neurologic exam for diabetic patient performed and documented this date, compliant with PQRS required standards. Detail of findings as noted in  physical exam. Advised patient importance of good routine lower extremity hygiene. Advised patient importance of evaluating for intact skin and pain free nail borders. Advised patient to use mirror to evaluate plantar/ soles of feet for better visualization. Advised patient monitor and phone office to be seen if any cracking to skin, open lesions, painful nail borders or if nails become elongated prior to next visit. Advised patient importance of daily cleansing of lower extremities, followed by good skin cream to maintain normal hydration of skin. Also advised patient importance of close daily monitoring of blood sugar. Advised to regulate diet and medications to maintain control of blood sugar in optimal range. Contact primary care provider if difficulties maintaining blood sugar levels. Advised Patient of presence of Diabetes Mellitus condition. Advised Patient risk of progression and worsening or improvement, then return of condition. Will monitor condition for any change in future. Treat with most appropriate treatment pending status of condition. Counseled and advised patient extensively on nature and ramifications of diabetes. Standard instructions given to patient for good diabetic foot care and maintenance. Advised importance of careful monitoring to avoid break down and complications secondary to diabetes. Advised patient importance of strict maintenance of blood sugar control. Advised patient of possible ominous results from neglect of condition, i.e.: amputation/ loss of digits, feet and legs, or even death. Patient states understands counseling, will monitor closely, continue good hygiene and routine diabetic foot care. Patient will contact office is questions or problems.   o Rx: DM Shoes  o Electronically Identified Patient Education Materials Provided Electronically  · Disposition  o Call or Return if symptoms worsen or persist.            Electronically Signed by: Reid Garcia DPM -Author on  April 1, 2021 01:33:18 PM

## 2021-05-15 VITALS
HEIGHT: 73 IN | DIASTOLIC BLOOD PRESSURE: 59 MMHG | WEIGHT: 189 LBS | OXYGEN SATURATION: 98 % | HEART RATE: 55 BPM | BODY MASS INDEX: 25.05 KG/M2 | SYSTOLIC BLOOD PRESSURE: 164 MMHG

## 2021-05-18 ENCOUNTER — OFFICE VISIT CONVERTED (OUTPATIENT)
Dept: FAMILY MEDICINE CLINIC | Age: 61
End: 2021-05-18
Attending: FAMILY MEDICINE

## 2021-05-18 ENCOUNTER — HOSPITAL ENCOUNTER (OUTPATIENT)
Dept: OTHER | Facility: HOSPITAL | Age: 61
Discharge: HOME | End: 2021-05-18
Attending: FAMILY MEDICINE

## 2021-05-18 LAB
ALBUMIN SERPL-MCNC: 4.4 G/DL (ref 3.5–5)
ALBUMIN/GLOB SERPL: 1.6 {RATIO} (ref 1.4–2.6)
ALP SERPL-CCNC: 113 U/L (ref 56–155)
ALT SERPL-CCNC: 14 U/L (ref 10–40)
ANION GAP SERPL CALC-SCNC: 16 MMOL/L (ref 8–19)
AST SERPL-CCNC: 15 U/L (ref 15–50)
BILIRUB SERPL-MCNC: 0.2 MG/DL (ref 0.2–1.3)
BUN SERPL-MCNC: 24 MG/DL (ref 5–25)
BUN/CREAT SERPL: 22 {RATIO} (ref 6–20)
CALCIUM SERPL-MCNC: 9.9 MG/DL (ref 8.7–10.4)
CHLORIDE SERPL-SCNC: 107 MMOL/L (ref 99–111)
CHOLEST SERPL-MCNC: 205 MG/DL (ref 107–200)
CHOLEST/HDLC SERPL: 5 {RATIO} (ref 3–6)
CONV CO2: 24 MMOL/L (ref 22–32)
CONV TOTAL PROTEIN: 7.2 G/DL (ref 6.3–8.2)
CREAT UR-MCNC: 1.08 MG/DL (ref 0.7–1.2)
EST. AVERAGE GLUCOSE BLD GHB EST-MCNC: 189 MG/DL
GFR SERPLBLD BASED ON 1.73 SQ M-ARVRAT: >60 ML/MIN/{1.73_M2}
GLOBULIN UR ELPH-MCNC: 2.8 G/DL (ref 2–3.5)
GLUCOSE SERPL-MCNC: 170 MG/DL (ref 70–99)
HBA1C MFR BLD: 8.2 % (ref 3.5–5.7)
HDLC SERPL-MCNC: 41 MG/DL (ref 40–60)
LDLC SERPL CALC-MCNC: 151 MG/DL (ref 70–100)
OSMOLALITY SERPL CALC.SUM OF ELEC: 302 MOSM/KG (ref 273–304)
POTASSIUM SERPL-SCNC: 5 MMOL/L (ref 3.5–5.3)
SODIUM SERPL-SCNC: 142 MMOL/L (ref 135–147)
TRIGL SERPL-MCNC: 65 MG/DL (ref 40–150)
VLDLC SERPL-MCNC: 13 MG/DL (ref 5–37)
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
## 2021-06-07 ENCOUNTER — TRANSCRIBE ORDERS (OUTPATIENT)
Dept: DIABETES SERVICES | Facility: HOSPITAL | Age: 61
End: 2021-06-07

## 2021-06-07 DIAGNOSIS — E11.65 TYPE 2 DIABETES MELLITUS WITH HYPERGLYCEMIA, WITHOUT LONG-TERM CURRENT USE OF INSULIN (HCC): Primary | ICD-10-CM

## 2021-06-11 DIAGNOSIS — E11.9 TYPE 2 DIABETES MELLITUS WITHOUT COMPLICATION, UNSPECIFIED WHETHER LONG TERM INSULIN USE (HCC): Primary | ICD-10-CM

## 2021-07-01 VITALS
DIASTOLIC BLOOD PRESSURE: 65 MMHG | HEIGHT: 74 IN | TEMPERATURE: 97.4 F | WEIGHT: 182.2 LBS | BODY MASS INDEX: 23.38 KG/M2 | HEART RATE: 53 BPM | SYSTOLIC BLOOD PRESSURE: 180 MMHG

## 2021-07-01 VITALS
BODY MASS INDEX: 22.95 KG/M2 | SYSTOLIC BLOOD PRESSURE: 146 MMHG | WEIGHT: 178.8 LBS | HEART RATE: 63 BPM | TEMPERATURE: 98 F | HEIGHT: 74 IN | DIASTOLIC BLOOD PRESSURE: 55 MMHG

## 2021-07-01 VITALS
TEMPERATURE: 97.5 F | WEIGHT: 181.6 LBS | HEART RATE: 61 BPM | DIASTOLIC BLOOD PRESSURE: 84 MMHG | HEIGHT: 74 IN | BODY MASS INDEX: 23.31 KG/M2 | SYSTOLIC BLOOD PRESSURE: 170 MMHG

## 2021-07-01 VITALS
BODY MASS INDEX: 23.2 KG/M2 | SYSTOLIC BLOOD PRESSURE: 177 MMHG | WEIGHT: 180.8 LBS | TEMPERATURE: 98.1 F | HEIGHT: 74 IN | DIASTOLIC BLOOD PRESSURE: 59 MMHG | HEART RATE: 63 BPM

## 2021-07-01 VITALS
BODY MASS INDEX: 23.64 KG/M2 | HEIGHT: 74 IN | HEART RATE: 63 BPM | TEMPERATURE: 97.4 F | WEIGHT: 184.2 LBS | DIASTOLIC BLOOD PRESSURE: 75 MMHG | SYSTOLIC BLOOD PRESSURE: 195 MMHG

## 2021-07-01 VITALS
BODY MASS INDEX: 23.85 KG/M2 | SYSTOLIC BLOOD PRESSURE: 200 MMHG | HEART RATE: 61 BPM | WEIGHT: 185.8 LBS | HEIGHT: 74 IN | TEMPERATURE: 97.8 F | DIASTOLIC BLOOD PRESSURE: 92 MMHG

## 2021-07-01 VITALS
HEIGHT: 74 IN | DIASTOLIC BLOOD PRESSURE: 58 MMHG | BODY MASS INDEX: 22.66 KG/M2 | WEIGHT: 176.6 LBS | HEART RATE: 55 BPM | SYSTOLIC BLOOD PRESSURE: 147 MMHG | TEMPERATURE: 97.5 F

## 2021-07-01 VITALS
HEART RATE: 55 BPM | WEIGHT: 182 LBS | DIASTOLIC BLOOD PRESSURE: 87 MMHG | HEIGHT: 74 IN | TEMPERATURE: 98.2 F | SYSTOLIC BLOOD PRESSURE: 190 MMHG | BODY MASS INDEX: 23.36 KG/M2

## 2021-07-02 VITALS
SYSTOLIC BLOOD PRESSURE: 146 MMHG | DIASTOLIC BLOOD PRESSURE: 54 MMHG | TEMPERATURE: 98.1 F | HEIGHT: 74 IN | HEART RATE: 66 BPM | BODY MASS INDEX: 24.05 KG/M2 | WEIGHT: 187.4 LBS

## 2021-07-02 VITALS
HEIGHT: 74 IN | SYSTOLIC BLOOD PRESSURE: 137 MMHG | TEMPERATURE: 97.9 F | DIASTOLIC BLOOD PRESSURE: 64 MMHG | WEIGHT: 187.8 LBS | BODY MASS INDEX: 24.1 KG/M2 | HEART RATE: 63 BPM

## 2021-07-02 VITALS
DIASTOLIC BLOOD PRESSURE: 55 MMHG | TEMPERATURE: 97.7 F | WEIGHT: 190.4 LBS | SYSTOLIC BLOOD PRESSURE: 164 MMHG | HEART RATE: 60 BPM | BODY MASS INDEX: 24.43 KG/M2 | HEIGHT: 74 IN

## 2021-07-02 VITALS
TEMPERATURE: 96.4 F | DIASTOLIC BLOOD PRESSURE: 58 MMHG | HEIGHT: 74 IN | WEIGHT: 190 LBS | HEART RATE: 55 BPM | BODY MASS INDEX: 24.38 KG/M2 | SYSTOLIC BLOOD PRESSURE: 165 MMHG

## 2021-07-02 VITALS
WEIGHT: 187.8 LBS | SYSTOLIC BLOOD PRESSURE: 221 MMHG | DIASTOLIC BLOOD PRESSURE: 80 MMHG | HEART RATE: 76 BPM | BODY MASS INDEX: 24.1 KG/M2 | TEMPERATURE: 97.8 F | HEIGHT: 74 IN

## 2021-07-02 VITALS
DIASTOLIC BLOOD PRESSURE: 68 MMHG | HEIGHT: 74 IN | WEIGHT: 189.4 LBS | HEART RATE: 63 BPM | TEMPERATURE: 97.2 F | BODY MASS INDEX: 24.31 KG/M2 | SYSTOLIC BLOOD PRESSURE: 136 MMHG

## 2021-07-02 VITALS
HEART RATE: 61 BPM | TEMPERATURE: 98.2 F | DIASTOLIC BLOOD PRESSURE: 68 MMHG | SYSTOLIC BLOOD PRESSURE: 161 MMHG | WEIGHT: 193 LBS | BODY MASS INDEX: 24.77 KG/M2 | HEIGHT: 74 IN

## 2021-07-07 DIAGNOSIS — E11.65 UNCONTROLLED TYPE 2 DIABETES MELLITUS WITH HYPERGLYCEMIA (HCC): Primary | ICD-10-CM

## 2021-07-07 RX ORDER — SILDENAFIL 50 MG/1
TABLET, FILM COATED ORAL
COMMUNITY
End: 2021-08-27

## 2021-07-07 RX ORDER — DIAZEPAM 5 MG/1
TABLET ORAL
COMMUNITY
End: 2022-03-25

## 2021-07-07 RX ORDER — BUSPIRONE HYDROCHLORIDE 10 MG/1
10 TABLET ORAL 3 TIMES DAILY
COMMUNITY
End: 2021-08-27

## 2021-07-07 RX ORDER — BLOOD-GLUCOSE METER
KIT MISCELLANEOUS
COMMUNITY
Start: 2021-05-13 | End: 2022-06-24 | Stop reason: SDUPTHER

## 2021-07-07 RX ORDER — BLOOD-GLUCOSE METER
KIT MISCELLANEOUS
COMMUNITY
Start: 2021-05-13 | End: 2023-01-03

## 2021-07-07 RX ORDER — ATORVASTATIN CALCIUM 40 MG/1
40 TABLET, FILM COATED ORAL DAILY
COMMUNITY
End: 2021-08-27

## 2021-07-07 RX ORDER — METHYLPREDNISOLONE 4 MG/1
TABLET ORAL
COMMUNITY
Start: 2021-05-18 | End: 2021-08-27

## 2021-07-08 ENCOUNTER — NUTRITION (OUTPATIENT)
Dept: DIABETES SERVICES | Facility: HOSPITAL | Age: 61
End: 2021-07-08

## 2021-07-08 VITALS — WEIGHT: 185.8 LBS | HEIGHT: 74 IN | BODY MASS INDEX: 23.85 KG/M2

## 2021-07-08 DIAGNOSIS — E11.65 UNCONTROLLED TYPE 2 DIABETES MELLITUS WITH HYPERGLYCEMIA (HCC): Primary | ICD-10-CM

## 2021-07-08 PROCEDURE — 97802 MEDICAL NUTRITION INDIV IN: CPT | Performed by: DIETITIAN, REGISTERED

## 2021-07-08 NOTE — PROGRESS NOTES
"Cleve Ibarra presents to [facility] for nutrition consult r/t diagnosis of T2DM.     General Information  General Information   Referral From:: MD niño  Height: 188 cm (74\")  Height Method: Actual  Weight: 84.3 kg (185 lb 12.8 oz)  Weight Method: Standing scale    Diabetes History  Diabetes History  What type of diabetes do you have?: Type 2  Length of Diabetes Diagnosis: 1 - 5 years  Current DM knowledge: good  Have you had diabetes education/teaching in the past?: no  Do you test your blood sugar at home?: yes  Frequency of checks: at least 2x/day  Typical readings: fasting- 150-250+; evening high 80s-low 90s    Nutrition Information  Nutrition Information  When was the last time you saw a dietician?: never  Are you currently following a special meal plan?: never  Do you use Food Assistance programs (WIC, food stamps, food bank)?: no  Do you need information about Food Assistance programs?: no  How many times do you drink milk per day?: 3 (gallon of whole milk/week)  How many meals do you eat each day?: 2    Medications  Pt reports taking Metformin 1000mg BID.  He was previously taking Januvia and Glipizide.  Meds were d/c d/t side effects.    Labs   Lab Results   Component Value Date    HGBA1C 8.2 (H) 05/18/2021        Nutrition counseling provided on carbohydrate counting, portion control, measuring and reading labels. Discussed eating out and gave suggestions on controlling carbohydrate intake and making healthier food choices.     Meal Plan:   Total Carbohydrates per meal: 3-4 carb servings/meal, at least 3 meals/day  Lean protein with meals.  Limit added fats.  Snacks: 1 carbohydrate serving (</= 22 g) + 1 protein serving.     Daily exercise encouraged (as recommended by healthcare provider). Discussed the befits of exercise in lowering blood glucose, blood pressure, cholesterol, stress and controlling body weight.     Advised to continue daily blood glucose monitoring to " assist with understanding of factors affecting blood glucose and assist with management of diabetes.  Discussed and provided with target BG ranges.     Literature provided: Diabetes Nutrition Placemat, Choose Your Foods Booklet    Phone number provided and encouraged to call with questions or concerns.     Time spent with patient: 30 minutes    Armida Shipman RDN, LD  07/08/2021

## 2021-08-27 ENCOUNTER — OFFICE VISIT (OUTPATIENT)
Dept: FAMILY MEDICINE CLINIC | Age: 61
End: 2021-08-27

## 2021-08-27 VITALS
WEIGHT: 189 LBS | HEIGHT: 74 IN | DIASTOLIC BLOOD PRESSURE: 63 MMHG | SYSTOLIC BLOOD PRESSURE: 154 MMHG | BODY MASS INDEX: 24.26 KG/M2 | HEART RATE: 60 BPM

## 2021-08-27 DIAGNOSIS — E11.9 TYPE 2 DIABETES MELLITUS WITHOUT COMPLICATION, WITHOUT LONG-TERM CURRENT USE OF INSULIN (HCC): Primary | ICD-10-CM

## 2021-08-27 DIAGNOSIS — I10 ESSENTIAL HYPERTENSION: ICD-10-CM

## 2021-08-27 DIAGNOSIS — E78.00 HIGH CHOLESTEROL: ICD-10-CM

## 2021-08-27 DIAGNOSIS — F41.9 ANXIETY: ICD-10-CM

## 2021-08-27 LAB — HBA1C MFR BLD: 7.7 %

## 2021-08-27 PROCEDURE — 99214 OFFICE O/P EST MOD 30 MIN: CPT | Performed by: FAMILY MEDICINE

## 2021-08-27 PROCEDURE — 83036 HEMOGLOBIN GLYCOSYLATED A1C: CPT | Performed by: FAMILY MEDICINE

## 2021-08-27 RX ORDER — LANCETS 28 GAUGE
EACH MISCELLANEOUS
Qty: 100 EACH | Refills: 3 | Status: SHIPPED | OUTPATIENT
Start: 2021-08-27 | End: 2023-01-03

## 2021-08-27 NOTE — ASSESSMENT & PLAN NOTE
Not at goal but improved.  A1c 7.7% today.  Continue Metformin 1000 mg twice daily.  Refill provided today.

## 2021-08-27 NOTE — PROGRESS NOTES
[facility]  Office Visit Note    Cleve Ibarra presents to [facility]  Encounter Date: 08/27/2021     Chief Complaint  Hypertension (Follow up), Hyperlipidemia (Follow up), and Diabetes (Follow up)    Subjective    History of Present Illness:    1.  Hypertension: Pertaining hypertension, Cleve takes amlodipine 10 mg daily, HCTZ 25 mg daily and lisinopril 40 mg daily.  Reports he is not yet taken his blood pressure medication this morning.  He checks his blood pressure regularly at home and says he has been running in the 130s over 70s on average.  2.  Hyperlipidemia: Pertaining to hyperlipidemia, Cleve was previously on Lipitor.  He has since stopped taking this.  He declines any additional medications today.  His most recent lipid panel on 5/18/2021 showed a total cholesterol 205 and an LDL of 151.  3.  Diabetes: Pertaining to type 2 diabetes, Cleve reports that he has been checking his blood sugar regularly with average readings from 100-200.  He has stopped taking Jardiance due to abdominal pain.  He is taking Metformin 1000 mg twice daily.  His most recent A1c was 8.2% on 5/18/2021.  4.  Anxiety: Pertaining anxiety, Cleve reports that his symptoms are stable on his current regimen of Valium 2.5 mg twice daily as needed.  He says his mood is stable.  He denies SI or HI.    Review of Systems:  Review of Systems   Constitutional: Negative for chills, fatigue, fever and unexpected weight loss.   Eyes: Negative for blurred vision.   Respiratory: Negative for cough and shortness of breath.    Cardiovascular: Negative for chest pain, palpitations and leg swelling.   Gastrointestinal: Negative for abdominal pain, constipation, diarrhea, nausea and vomiting.   Endocrine: Negative for cold intolerance, heat intolerance, polydipsia, polyphagia and polyuria.   Neurological: Negative for dizziness, weakness, numbness and headache.   Psychiatric/Behavioral:  "Negative for sleep disturbance, suicidal ideas and depressed mood. The patient is not nervous/anxious.         Objective   Vital Signs:  /63 (BP Location: Right arm, Patient Position: Sitting, Cuff Size: Adult)   Pulse 60   Ht 188 cm (74\")   Wt 85.7 kg (189 lb)   BMI 24.27 kg/m²      Physical Examination:  Physical Exam  Vitals reviewed.   Constitutional:       General: He is not in acute distress.     Appearance: Normal appearance.   HENT:      Head: Normocephalic and atraumatic.   Eyes:      Conjunctiva/sclera: Conjunctivae normal.   Cardiovascular:      Rate and Rhythm: Normal rate and regular rhythm.      Heart sounds: No murmur heard.     Pulmonary:      Effort: Pulmonary effort is normal. No respiratory distress.      Breath sounds: Normal breath sounds.   Musculoskeletal:      Right lower leg: No edema.      Left lower leg: No edema.   Skin:     General: Skin is warm and dry.      Findings: No rash.   Neurological:      General: No focal deficit present.      Mental Status: He is alert and oriented to person, place, and time.   Psychiatric:         Mood and Affect: Mood normal.         Behavior: Behavior normal.         Result Review   The following data was reviewed by: Dmitry Cruz MD on 08/27/2021:  Diabetes Panel 1 (05/18/2021 09:26)  TSH (11/02/2020 08:25)  CBC & Differential (11/02/2020 08:25)    Procedures:    No procedures associated with this visit.       Assessment and Plan:  Diagnoses and all orders for this visit:    1. Type 2 diabetes mellitus without complication, without long-term current use of insulin (CMS/[facility]) (Primary)  Assessment & Plan:  Not at goal but improved.  A1c 7.7% today.  Continue Metformin 1000 mg twice daily.  Refill provided today.    Orders:  -     POC Glycosylated Hemoglobin (Hb A1C)    2. Essential hypertension  Assessment & Plan:  Not controlled in office today.  Patient has not yet taken his medicine as such we will make no changes as he is reported is " controlled at home.  Continue lisinopril 40 mg daily, HCTZ 25 mg daily and amlodipine 10 mg daily.      3. High cholesterol  Assessment & Plan:  Not controlled.  Patient is not currently on medication.  He declines initiation at this time.  Will recheck lipid panel next visit.      4. Anxiety  Assessment & Plan:  Stable.  Continue Valium 2.5 mg twice daily as needed.      Other orders  -     metFORMIN (GLUCOPHAGE) 1000 MG tablet; Take 1 tablet by mouth 2 (two) times a day.  Dispense: 180 tablet; Refill: 1  -     Lancets (freestyle) lancets; Check blood sugar 1 to 2 times per day as instructed.  Dispense: 100 each; Refill: 3      Return in about 3 months (around 11/27/2021).    Patient was given instructions and counseling regarding his condition or for health maintenance advice. Please see specific information pulled into the AVS if appropriate.      Dmitry Cruz MD   8/27/2021

## 2021-08-27 NOTE — ASSESSMENT & PLAN NOTE
Not controlled.  Patient is not currently on medication.  He declines initiation at this time.  Will recheck lipid panel next visit.

## 2021-08-27 NOTE — ASSESSMENT & PLAN NOTE
Not controlled in office today.  Patient has not yet taken his medicine as such we will make no changes as he is reported is controlled at home.  Continue lisinopril 40 mg daily, HCTZ 25 mg daily and amlodipine 10 mg daily.

## 2021-10-29 ENCOUNTER — TELEPHONE (OUTPATIENT)
Dept: FAMILY MEDICINE CLINIC | Age: 61
End: 2021-10-29

## 2021-10-29 PROBLEM — E78.2 MIXED HYPERLIPIDEMIA: Status: ACTIVE | Noted: 2021-08-27

## 2021-10-29 RX ORDER — LISINOPRIL 40 MG/1
40 TABLET ORAL 2 TIMES DAILY
Qty: 60 TABLET | Refills: 0 | Status: SHIPPED | OUTPATIENT
Start: 2021-10-29 | End: 2021-11-23 | Stop reason: SDUPTHER

## 2021-10-29 NOTE — TELEPHONE ENCOUNTER
Caller: Cleve Ibarra    Relationship: Self      Medication requested (name and dosage):     Requested Prescriptions:   Requested Prescriptions     Pending Prescriptions Disp Refills   • lisinopril (PRINIVIL,ZESTRIL) 40 MG tablet 180 tablet 0     Sig: Take 1 tablet by mouth 2 (Two) Times a Day.        Pharmacy where request should be sent: [facility] [place]  [phone]    Additional details provided by patient: PATIENT HAS A TWO DAY SUPPLY OF MEDICATION    Best call back number: [phone]     Does the patient have less than a 3 day supply:  [x] Yes  [] No    Jerrod Pizarro Rep   10/29/21 11:10 EDT

## 2021-11-01 RX ORDER — LISINOPRIL 40 MG/1
40 TABLET ORAL 2 TIMES DAILY
Qty: 180 TABLET | Refills: 0 | OUTPATIENT
Start: 2021-11-01

## 2021-11-04 ENCOUNTER — OFFICE VISIT (OUTPATIENT)
Dept: FAMILY MEDICINE CLINIC | Age: 61
End: 2021-11-04

## 2021-11-04 VITALS
TEMPERATURE: 98.1 F | BODY MASS INDEX: 24.9 KG/M2 | HEIGHT: 74 IN | SYSTOLIC BLOOD PRESSURE: 175 MMHG | HEART RATE: 60 BPM | WEIGHT: 194 LBS | DIASTOLIC BLOOD PRESSURE: 76 MMHG

## 2021-11-04 DIAGNOSIS — E11.9 TYPE 2 DIABETES MELLITUS WITHOUT COMPLICATION, WITHOUT LONG-TERM CURRENT USE OF INSULIN (HCC): ICD-10-CM

## 2021-11-04 DIAGNOSIS — F41.9 ANXIETY: Primary | ICD-10-CM

## 2021-11-04 DIAGNOSIS — Z11.59 ENCOUNTER FOR SCREENING FOR OTHER VIRAL DISEASES: ICD-10-CM

## 2021-11-04 DIAGNOSIS — K59.09 OTHER CONSTIPATION: ICD-10-CM

## 2021-11-04 DIAGNOSIS — E78.2 MIXED HYPERLIPIDEMIA: ICD-10-CM

## 2021-11-04 DIAGNOSIS — I10 PRIMARY HYPERTENSION: ICD-10-CM

## 2021-11-04 PROCEDURE — 99214 OFFICE O/P EST MOD 30 MIN: CPT | Performed by: FAMILY MEDICINE

## 2021-11-04 RX ORDER — QUETIAPINE FUMARATE 25 MG/1
25 TABLET, FILM COATED ORAL NIGHTLY
Qty: 30 TABLET | Refills: 2 | Status: SHIPPED | OUTPATIENT
Start: 2021-11-04 | End: 2022-02-21 | Stop reason: SDUPTHER

## 2021-11-04 NOTE — ASSESSMENT & PLAN NOTE
"Blood pressure tends to run higher here at the office than it does at home.  Would like him to continue monitoring that for me.  He is currently maxed out on 3 different pressure medications.  He does attribute his elevated pressure to his anxiety and thinks that if he could get his \"nerves\" under better control, he might not need so much in the way of antihypertensives.  "

## 2021-11-04 NOTE — ASSESSMENT & PLAN NOTE
He is on metformin for diabetes.  No refills needed.  Last A1c was 7.7 in August down from 8.2 three months prior.  Due for repeat labs, ordered.  He is due for an eye exam.  I placed a referral to Dr. Quiroga for that.  Foot exam was deferred today till next visit.

## 2021-11-04 NOTE — ASSESSMENT & PLAN NOTE
Cleve and I had a long discussion today about his anxiety.  He has been taking the Valium prescribed by first doctor similar in the Dr. Cruz.  However, he really only uses it once or maybe twice a month, when things get really bad.  He likes the way that it makes him feel as far as relief of the anxiety goes, but he does seem to get some side effects with it including variations in his blood pressure and blood sugar.  He also worries about the possibility of addiction.  We discussed today that Valium is really not a good long-term medication for treatment of anxiety in any case and it would be better if we could find something that would be a daily medication he could take to keep his anxiety at bay rather than something with such an immediate reaction.  It looks like he has been on a couple of different SSRIs and one SNRI as well as Wellbutrin and buspirone.  He had issues either tolerating or with poor efficacy for all of these.  Upon further questioning, he is actually been on Seroquel in the past and does not remember having any bad effects with that.  He is extremely hesitant to try any kind of new medication, which complicates things.  I think it would be reasonable to try putting him back on a low-dose of Seroquel at bedtime to see if we can get better control of his anxiety.  He has done therapy in the past at [facility] but did not get much benefit from that.  It might be difficult for us to find someone else who takes Medicaid, but I do think that he might do better with a more stable relationship with a therapist.  [facility] tends to have a lot of turnover and he said that he did not get to see the same person generally speaking.

## 2021-11-04 NOTE — ASSESSMENT & PLAN NOTE
He is not currently on medication.  He has a lot of concerns about taking medications due to possible side effects.  He would benefit from being on a statin.  However, I think it would be advisable to wait until we get his anxiety issues under better control.  I suspect he would not stay on a statin if started at this time.

## 2021-11-04 NOTE — ASSESSMENT & PLAN NOTE
I would like him to try adding in fiber gummy daily.  He has been on Metamucil in the past but had difficulty with that because of the amount of water he felt he needed to drink with it.

## 2021-11-04 NOTE — PROGRESS NOTES
"Chief Complaint  Hypertension (lupe from dara )    Subjective          Cleve Ibarra presents to [facility] today to [facility] care with me from Dr. Cruz.    \"I need something for stress.\"  He is on Valium and takes it \"once every month or two.\"  He doesn't like to take it, though -- he feels that it makes his BP and BG both get out of whack.  He has tried multiple medications for his anxiety starting back in 2019 with Dr. Dubois.  He has been on bupropion, buspirone (ineffective), Zoloft (cause shakiness), fluoxetine (intolerant), Effexor (irritability).  He can get to sleep fine.  He describes his mood as \"as pretty good,\" but he can get impatient.  He has therapy in the past at [facility] but did not get much benefit from it.  He has even tried hypnosis.  He gets very anxious about trying new medications.  \"If I read about the side effects, I just get too nervous and cannot take them.\"    He is on metformin for diabetes.  Last A1c was 7.7 in August down from 8.2 three months prior.  He is due for eye exam.  He is due for foot exam.    He is on amlodipine, lisinopril and HCTZ for hypertension.  He denies chest pain, palpitations or shortness of breath.    He has had issues with constipation.      He has nocturia and frequency.  No weak stream or dribbling.        Current Outpatient Medications:   •  amLODIPine (NORVASC) 10 MG tablet, Take 1 tablet by mouth Daily., Disp: 90 tablet, Rfl: 0  •  Blood Glucose Monitoring Suppl (FreeStyle Lite) device, , Disp: , Rfl:   •  diazePAM (Valium) 5 MG tablet, Valium 5 mg oral tablet take 1/2  tablet (5 mg) by oral route 2 times per day   Active, Disp: , Rfl:   •  FREESTYLE LITE test strip, , Disp: , Rfl:   •  hydroCHLOROthiazide (HYDRODIURIL) 25 MG tablet, Take 1 tablet by mouth 2 (Two) Times a Day., Disp: 180 tablet, Rfl: 0  •  Lancets (freestyle) lancets, Check blood sugar 1 to 2 times per day as instructed., Disp: 100 each, Rfl: 3  •  " "lisinopril (PRINIVIL,ZESTRIL) 40 MG tablet, Take 1 tablet by mouth 2 (Two) Times a Day., Disp: 60 tablet, Rfl: 0  •  metFORMIN (GLUCOPHAGE) 1000 MG tablet, Take 1 tablet by mouth 2 (two) times a day., Disp: 180 tablet, Rfl: 1  •  QUEtiapine (SEROquel) 25 MG tablet, Take 1 tablet by mouth Every Night., Disp: 30 tablet, Rfl: 2    Allergies:  Clonidine hcl, Fluoxetine, Sertraline, and Venlafaxine      Objective   Vital Signs:   /76   Pulse 60   Temp 98.1 °F (36.7 °C) (Oral)   Ht 188 cm (74\")   Wt 88 kg (194 lb)   BMI 24.91 kg/m²     Physical Exam  Vitals reviewed.   Constitutional:       General: He is not in acute distress.     Appearance: Normal appearance. He is well-developed.   HENT:      Head: Normocephalic and atraumatic.      Right Ear: External ear normal.      Left Ear: External ear normal.      Nose: Nose normal.      Mouth/Throat:      Mouth: Mucous membranes are moist.   Eyes:      Extraocular Movements: Extraocular movements intact.      Conjunctiva/sclera: Conjunctivae normal.      Pupils: Pupils are equal, round, and reactive to light.   Cardiovascular:      Rate and Rhythm: Normal rate and regular rhythm.      Heart sounds: No murmur heard.      Pulmonary:      Effort: Pulmonary effort is normal.      Breath sounds: Normal breath sounds. No wheezing, rhonchi or rales.   Abdominal:      General: Bowel sounds are normal. There is no distension.      Palpations: Abdomen is soft.      Tenderness: There is no abdominal tenderness.   Musculoskeletal:         General: Normal range of motion.   Neurological:      Mental Status: He is alert.   Psychiatric:         Mood and Affect: Affect normal.             Assessment and Plan    Diagnoses and all orders for this visit:    1. Anxiety (Primary)  Assessment & Plan:  Cleve and I had a long discussion today about his anxiety.  He has been taking the Valium prescribed by first doctor similar in the Dr. Cruz.  However, he really only uses it once or " maybe twice a month, when things get really bad.  He likes the way that it makes him feel as far as relief of the anxiety goes, but he does seem to get some side effects with it including variations in his blood pressure and blood sugar.  He also worries about the possibility of addiction.  We discussed today that Valium is really not a good long-term medication for treatment of anxiety in any case and it would be better if we could find something that would be a daily medication he could take to keep his anxiety at bay rather than something with such an immediate reaction.  It looks like he has been on a couple of different SSRIs and one SNRI as well as Wellbutrin and buspirone.  He had issues either tolerating or with poor efficacy for all of these.  Upon further questioning, he is actually been on Seroquel in the past and does not remember having any bad effects with that.  He is extremely hesitant to try any kind of new medication, which complicates things.  I think it would be reasonable to try putting him back on a low-dose of Seroquel at bedtime to see if we can get better control of his anxiety.  He has done therapy in the past at [facility] but did not get much benefit from that.  It might be difficult for us to find someone else who takes Medicaid, but I do think that he might do better with a more stable relationship with a therapist.  [facility] tends to have a lot of turnover and he said that he did not get to see the same person generally speaking.    Orders:  -     QUEtiapine (SEROquel) 25 MG tablet; Take 1 tablet by mouth Every Night.  Dispense: 30 tablet; Refill: 2    2. Type 2 diabetes mellitus without complication, without long-term current use of insulin ([facility])  Assessment & Plan:  He is on metformin for diabetes.  No refills needed.  Last A1c was 7.7 in August down from 8.2 three months prior.  Due for repeat labs, ordered.  He is due for an eye exam.  I placed a referral to Dr. Quiroga for  "that.  Foot exam was deferred today till next visit.    Orders:  -     Comprehensive Metabolic Panel; Future  -     Lipid Panel; Future  -     Hemoglobin A1c; Future  -     Microalbumin / Creatinine Urine Ratio - Urine, Clean Catch; Future  -     Ambulatory Referral for Diabetic Eye Exam-Optometry    3. Primary hypertension  Assessment & Plan:  Blood pressure tends to run higher here at the office than it does at home.  Would like him to continue monitoring that for me.  He is currently maxed out on 3 different pressure medications.  He does attribute his elevated pressure to his anxiety and thinks that if he could get his \"nerves\" under better control, he might not need so much in the way of antihypertensives.      4. Mixed hyperlipidemia  Assessment & Plan:  He is not currently on medication.  He has a lot of concerns about taking medications due to possible side effects.  He would benefit from being on a statin.  However, I think it would be advisable to wait until we get his anxiety issues under better control.  I suspect he would not stay on a statin if started at this time.      5. Other constipation  Assessment & Plan:  I would like him to try adding in fiber gummy daily.  He has been on Metamucil in the past but had difficulty with that because of the amount of water he felt he needed to drink with it.      6. Encounter for screening for other viral diseases  -     Hepatitis C Antibody; Future      Follow Up   Return in about 3 months (around 2/4/2022) for Annual physical.  Patient was given instructions and counseling regarding his condition or for health maintenance advice. Please see specific information pulled into the AVS if appropriate.       "

## 2021-11-05 ENCOUNTER — TELEPHONE (OUTPATIENT)
Dept: FAMILY MEDICINE CLINIC | Age: 61
End: 2021-11-05

## 2021-11-05 NOTE — TELEPHONE ENCOUNTER
Seroquel 25mg not cover by insurance under Dx of anxiety.  Do you have another DX or want to change to another med?

## 2021-11-08 ENCOUNTER — TELEPHONE (OUTPATIENT)
Dept: FAMILY MEDICINE CLINIC | Age: 61
End: 2021-11-08

## 2021-11-16 NOTE — TELEPHONE ENCOUNTER
Let's still try to run it through major depression as below before we throw in the towel.  Thanks, BZERINN

## 2021-11-16 NOTE — TELEPHONE ENCOUNTER
Spoke to pt and he said if insurance will not pay for it he will just do without.  That insurance companies should not be able to tell the MD what he can and can not have.  I explained that we had no control over that, but he still says he will just do without.

## 2021-11-16 NOTE — TELEPHONE ENCOUNTER
OK to try major depression, mild, recurrent.  I do note that he can get a 30 DS for $9 at [facility] or a 90 DS for $24, so that might be more doable if insurance continues to decline.  Thanks, BZERINN

## 2021-11-23 RX ORDER — HYDROCHLOROTHIAZIDE 25 MG/1
25 TABLET ORAL 2 TIMES DAILY
Qty: 180 TABLET | Refills: 1 | Status: SHIPPED | OUTPATIENT
Start: 2021-11-23 | End: 2022-05-23 | Stop reason: SDUPTHER

## 2021-11-23 RX ORDER — LISINOPRIL 40 MG/1
40 TABLET ORAL 2 TIMES DAILY
Qty: 60 TABLET | Refills: 1 | Status: SHIPPED | OUTPATIENT
Start: 2021-11-23 | End: 2022-02-03 | Stop reason: SDUPTHER

## 2022-02-03 RX ORDER — LISINOPRIL 40 MG/1
40 TABLET ORAL 2 TIMES DAILY
Qty: 180 TABLET | Refills: 1 | Status: SHIPPED | OUTPATIENT
Start: 2022-02-03 | End: 2022-03-25 | Stop reason: SDUPTHER

## 2022-02-08 RX ORDER — AMLODIPINE BESYLATE 10 MG/1
10 TABLET ORAL DAILY
Qty: 90 TABLET | Refills: 1 | Status: SHIPPED | OUTPATIENT
Start: 2022-02-08 | End: 2022-09-28 | Stop reason: SDUPTHER

## 2022-02-21 DIAGNOSIS — F41.9 ANXIETY: ICD-10-CM

## 2022-02-22 RX ORDER — QUETIAPINE FUMARATE 25 MG/1
25 TABLET, FILM COATED ORAL NIGHTLY
Qty: 30 TABLET | Refills: 2 | Status: SHIPPED | OUTPATIENT
Start: 2022-02-22 | End: 2022-03-25

## 2022-03-25 ENCOUNTER — OFFICE VISIT (OUTPATIENT)
Dept: FAMILY MEDICINE CLINIC | Age: 62
End: 2022-03-25

## 2022-03-25 ENCOUNTER — LAB (OUTPATIENT)
Dept: LAB | Facility: HOSPITAL | Age: 62
End: 2022-03-25

## 2022-03-25 VITALS
TEMPERATURE: 98 F | SYSTOLIC BLOOD PRESSURE: 142 MMHG | WEIGHT: 195 LBS | HEART RATE: 56 BPM | DIASTOLIC BLOOD PRESSURE: 57 MMHG | HEIGHT: 74 IN | OXYGEN SATURATION: 100 % | BODY MASS INDEX: 25.03 KG/M2

## 2022-03-25 DIAGNOSIS — Z11.59 ENCOUNTER FOR SCREENING FOR OTHER VIRAL DISEASES: ICD-10-CM

## 2022-03-25 DIAGNOSIS — I83.91 ASYMPTOMATIC VARICOSE VEINS OF RIGHT LOWER EXTREMITY: ICD-10-CM

## 2022-03-25 DIAGNOSIS — E11.42 TYPE 2 DIABETES MELLITUS WITH DIABETIC POLYNEUROPATHY, WITHOUT LONG-TERM CURRENT USE OF INSULIN: ICD-10-CM

## 2022-03-25 DIAGNOSIS — E11.42 TYPE 2 DIABETES MELLITUS WITH DIABETIC POLYNEUROPATHY, WITHOUT LONG-TERM CURRENT USE OF INSULIN: Primary | ICD-10-CM

## 2022-03-25 DIAGNOSIS — E78.2 MIXED HYPERLIPIDEMIA: ICD-10-CM

## 2022-03-25 DIAGNOSIS — I10 PRIMARY HYPERTENSION: ICD-10-CM

## 2022-03-25 DIAGNOSIS — F33.1 MAJOR DEPRESSIVE DISORDER, RECURRENT EPISODE, MODERATE DEGREE: ICD-10-CM

## 2022-03-25 LAB
ALBUMIN SERPL-MCNC: 4.7 G/DL (ref 3.5–5.2)
ALBUMIN/GLOB SERPL: 2 G/DL
ALP SERPL-CCNC: 141 U/L (ref 39–117)
ALT SERPL W P-5'-P-CCNC: 20 U/L (ref 1–41)
ANION GAP SERPL CALCULATED.3IONS-SCNC: 10 MMOL/L (ref 5–15)
AST SERPL-CCNC: 17 U/L (ref 1–40)
BILIRUB SERPL-MCNC: 0.3 MG/DL (ref 0–1.2)
BUN SERPL-MCNC: 31 MG/DL (ref 8–23)
BUN/CREAT SERPL: 24.6 (ref 7–25)
CALCIUM SPEC-SCNC: 9.9 MG/DL (ref 8.6–10.5)
CHLORIDE SERPL-SCNC: 103 MMOL/L (ref 98–107)
CHOLEST SERPL-MCNC: 243 MG/DL (ref 0–200)
CO2 SERPL-SCNC: 25 MMOL/L (ref 22–29)
CREAT SERPL-MCNC: 1.26 MG/DL (ref 0.76–1.27)
EGFRCR SERPLBLD CKD-EPI 2021: 64.5 ML/MIN/1.73
GLOBULIN UR ELPH-MCNC: 2.3 GM/DL
GLUCOSE SERPL-MCNC: 240 MG/DL (ref 65–99)
HBA1C MFR BLD: 10.7 % (ref 4.8–5.6)
HCV AB SER DONR QL: NORMAL
HDLC SERPL-MCNC: 37 MG/DL (ref 40–60)
LDLC SERPL CALC-MCNC: 168 MG/DL (ref 0–100)
LDLC/HDLC SERPL: 4.48 {RATIO}
POTASSIUM SERPL-SCNC: 5.1 MMOL/L (ref 3.5–5.2)
PROT SERPL-MCNC: 7 G/DL (ref 6–8.5)
SODIUM SERPL-SCNC: 138 MMOL/L (ref 136–145)
TRIGL SERPL-MCNC: 202 MG/DL (ref 0–150)
VLDLC SERPL-MCNC: 38 MG/DL (ref 5–40)

## 2022-03-25 PROCEDURE — 83036 HEMOGLOBIN GLYCOSYLATED A1C: CPT

## 2022-03-25 PROCEDURE — 36415 COLL VENOUS BLD VENIPUNCTURE: CPT

## 2022-03-25 PROCEDURE — 86803 HEPATITIS C AB TEST: CPT

## 2022-03-25 PROCEDURE — 80061 LIPID PANEL: CPT

## 2022-03-25 PROCEDURE — 80053 COMPREHEN METABOLIC PANEL: CPT

## 2022-03-25 PROCEDURE — 99214 OFFICE O/P EST MOD 30 MIN: CPT | Performed by: FAMILY MEDICINE

## 2022-03-25 RX ORDER — LISINOPRIL 20 MG/1
20 TABLET ORAL 2 TIMES DAILY
Qty: 180 TABLET | Refills: 1 | Status: SHIPPED | OUTPATIENT
Start: 2022-03-25 | End: 2022-09-15 | Stop reason: SDUPTHER

## 2022-03-25 NOTE — ASSESSMENT & PLAN NOTE
Bulging varicosities of the right lower extremity.  Not currently tender or symptomatic.  Recommend compression stockings and watchful waiting otherwise.

## 2022-03-25 NOTE — PROGRESS NOTES
"Chief Complaint  Diabetes (3MO)    Subjective          Cleve Ibarra presents to [facility] today for follow-up on routine issues.    He has been noticing some enlargement of the varicose veins of the R leg.    He stopped taking the Seroquel that we started at last visit.  He felt that it made him very jittery, \"like I drank 100 cups of coffee.\"  He has been on Valium in the past but we did discontinue that at his last visit here.  He has struggled with anxiety and depression for some time.    He is on metformin for diabetes.  He is due for lab work.  He is due for eye exam.  He is due for foot exam.    He is on lisinopril, amlodipine and hydrochlorothiazide for hypertension.  He has been taking the lisinopril 1/2 tab twice daily instead of a full tab.  No problems with chest pain, palpitations or shortness of air.    He has had issues with constipation.        Current Outpatient Medications:   •  amLODIPine (NORVASC) 10 MG tablet, Take 1 tablet by mouth Daily., Disp: 90 tablet, Rfl: 1  •  Blood Glucose Monitoring Suppl (FreeStyle Lite) device, , Disp: , Rfl:   •  FREESTYLE LITE test strip, , Disp: , Rfl:   •  hydroCHLOROthiazide (HYDRODIURIL) 25 MG tablet, Take 1 tablet by mouth 2 (Two) Times a Day., Disp: 180 tablet, Rfl: 1  •  Lancets (freestyle) lancets, Check blood sugar 1 to 2 times per day as instructed. (Patient taking differently: Check blood sugar 1 to 2 times per day as instructed.), Disp: 100 each, Rfl: 3  •  lisinopril (PRINIVIL,ZESTRIL) 20 MG tablet, Take 1 tablet by mouth 2 (Two) Times a Day., Disp: 180 tablet, Rfl: 1  •  metFORMIN (GLUCOPHAGE) 1000 MG tablet, Take 1 tablet by mouth 2 (Two) Times a Day With Meals., Disp: 180 tablet, Rfl: 1    Allergies:  Clonidine hcl, Fluoxetine, Sertraline, and Venlafaxine      Objective   Vital Signs:   /57 (BP Location: Left arm, Patient Position: Sitting)   Pulse 56   Temp 98 °F (36.7 °C)   Ht 188 cm (74\")   Wt 88.5 kg " (195 lb)   SpO2 100%   BMI 25.04 kg/m²     Physical Exam  Vitals reviewed.   Constitutional:       General: He is not in acute distress.     Appearance: Normal appearance. He is well-developed.   HENT:      Head: Normocephalic and atraumatic.      Right Ear: External ear normal.      Left Ear: External ear normal.      Nose: Nose normal.      Mouth/Throat:      Mouth: Mucous membranes are moist.   Eyes:      Extraocular Movements: Extraocular movements intact.      Conjunctiva/sclera: Conjunctivae normal.      Pupils: Pupils are equal, round, and reactive to light.   Cardiovascular:      Rate and Rhythm: Normal rate and regular rhythm.      Pulses:           Dorsalis pedis pulses are 2+ on the right side and 2+ on the left side.      Heart sounds: No murmur heard.  Pulmonary:      Effort: Pulmonary effort is normal.      Breath sounds: Normal breath sounds. No wheezing, rhonchi or rales.   Abdominal:      General: Bowel sounds are normal. There is no distension.      Palpations: Abdomen is soft.      Tenderness: There is no abdominal tenderness.   Musculoskeletal:         General: Normal range of motion.   Feet:      Right foot:      Protective Sensation: 3 sites tested. 0 sites sensed.      Skin integrity: Skin integrity normal. No ulcer or blister.      Toenail Condition: Right toenails are abnormally thick.      Left foot:      Protective Sensation: 3 sites tested. 0 sites sensed.      Skin integrity: Skin integrity normal. No ulcer or blister.      Toenail Condition: Left toenails are abnormally thick.      Comments: Diabetic Foot Exam Performed     Neurological:      Mental Status: He is alert.   Psychiatric:         Mood and Affect: Affect normal.             Assessment and Plan    Diagnoses and all orders for this visit:    1. Type 2 diabetes mellitus with diabetic polyneuropathy, without long-term current use of insulin (HCC) (Primary)  Assessment & Plan:  He is on metformin for diabetes.  No refills  needed.  He is due for lab work; ordered.  He is due for eye exam; deferred.  He is due for foot exam; exam today shows loss of protective sensation.  He does have some hypertrophic nails and would benefit from more regular foot care, so will place referral to Dr. Crane per his request.    Orders:  -     Comprehensive Metabolic Panel; Future  -     Lipid Panel; Future  -     Hemoglobin A1c; Future  -     Microalbumin / Creatinine Urine Ratio - Urine, Clean Catch; Future  -     Ambulatory Referral to Podiatry    2. Primary hypertension  Assessment & Plan:  Blood pressure is generally running at goal.  I am going to send in lisinopril for 20 mg instead of 40 since he is cutting those in half and it sounds like the accuracy of his dosing may be suspect.  He may try taking his HCTZ 50 mg in the morning instead of 25 mg twice daily since he is getting up frequently at night to urinate.  If this works better for him, he can just let me know and I will send in the HCTZ for 50 mg instead of 25.    Orders:  -     lisinopril (PRINIVIL,ZESTRIL) 20 MG tablet; Take 1 tablet by mouth 2 (Two) Times a Day.  Dispense: 180 tablet; Refill: 1    3. Mixed hyperlipidemia  Assessment & Plan:  Not currently on medication.  Checking labs.      4. Major depressive disorder, recurrent episode, moderate degree (HCC)  Assessment & Plan:  Not currently on medications.  The Seroquel gave him adverse effects.  However, he does not seem to need additional medication for this at this time.  Continue to monitor closely.      5. Asymptomatic varicose veins of right lower extremity  Assessment & Plan:  Bulging varicosities of the right lower extremity.  Not currently tender or symptomatic.  Recommend compression stockings and watchful waiting otherwise.      6. Encounter for screening for other viral diseases  -     Hepatitis C Antibody; Future      Follow Up   Return in about 3 months (around 6/25/2022) for Annual physical.  Patient was given  instructions and counseling regarding his condition or for health maintenance advice. Please see specific information pulled into the AVS if appropriate.

## 2022-03-25 NOTE — ASSESSMENT & PLAN NOTE
Not currently on medications.  The Seroquel gave him adverse effects.  However, he does not seem to need additional medication for this at this time.  Continue to monitor closely.

## 2022-03-25 NOTE — ASSESSMENT & PLAN NOTE
Blood pressure is generally running at goal.  I am going to send in lisinopril for 20 mg instead of 40 since he is cutting those in half and it sounds like the accuracy of his dosing may be suspect.  He may try taking his HCTZ 50 mg in the morning instead of 25 mg twice daily since he is getting up frequently at night to urinate.  If this works better for him, he can just let me know and I will send in the HCTZ for 50 mg instead of 25.

## 2022-03-25 NOTE — ASSESSMENT & PLAN NOTE
He is on metformin for diabetes.  No refills needed.  He is due for lab work; ordered.  He is due for eye exam; deferred.  He is due for foot exam; exam today shows loss of protective sensation.  He does have some hypertrophic nails and would benefit from more regular foot care, so will place referral to Dr. Crane per his request.

## 2022-03-31 ENCOUNTER — LAB (OUTPATIENT)
Dept: LAB | Facility: HOSPITAL | Age: 62
End: 2022-03-31

## 2022-03-31 PROCEDURE — 82570 ASSAY OF URINE CREATININE: CPT

## 2022-03-31 PROCEDURE — 82043 UR ALBUMIN QUANTITATIVE: CPT

## 2022-04-01 LAB
ALBUMIN UR-MCNC: 14.7 MG/DL
CREAT UR-MCNC: 31.6 MG/DL
MICROALBUMIN/CREAT UR: 465.2 MG/G

## 2022-05-23 RX ORDER — HYDROCHLOROTHIAZIDE 25 MG/1
25 TABLET ORAL 2 TIMES DAILY
Qty: 180 TABLET | Refills: 1 | Status: SHIPPED | OUTPATIENT
Start: 2022-05-23 | End: 2022-11-22 | Stop reason: SDUPTHER

## 2022-06-24 ENCOUNTER — OFFICE VISIT (OUTPATIENT)
Dept: FAMILY MEDICINE CLINIC | Age: 62
End: 2022-06-24

## 2022-06-24 VITALS
SYSTOLIC BLOOD PRESSURE: 149 MMHG | HEART RATE: 54 BPM | BODY MASS INDEX: 24.31 KG/M2 | DIASTOLIC BLOOD PRESSURE: 59 MMHG | WEIGHT: 189.4 LBS | HEIGHT: 74 IN

## 2022-06-24 DIAGNOSIS — I10 PRIMARY HYPERTENSION: ICD-10-CM

## 2022-06-24 DIAGNOSIS — F33.1 MAJOR DEPRESSIVE DISORDER, RECURRENT EPISODE, MODERATE DEGREE: ICD-10-CM

## 2022-06-24 DIAGNOSIS — Z00.00 ANNUAL PHYSICAL EXAM: Primary | ICD-10-CM

## 2022-06-24 DIAGNOSIS — M79.672 PAIN IN BOTH FEET: ICD-10-CM

## 2022-06-24 DIAGNOSIS — E11.42 TYPE 2 DIABETES MELLITUS WITH DIABETIC POLYNEUROPATHY, WITHOUT LONG-TERM CURRENT USE OF INSULIN: ICD-10-CM

## 2022-06-24 DIAGNOSIS — E78.2 MIXED HYPERLIPIDEMIA: ICD-10-CM

## 2022-06-24 DIAGNOSIS — F41.9 ANXIETY: ICD-10-CM

## 2022-06-24 DIAGNOSIS — M79.671 PAIN IN BOTH FEET: ICD-10-CM

## 2022-06-24 LAB
EXPIRATION DATE: NORMAL
HBA1C MFR BLD: 7.8 %
Lab: NORMAL

## 2022-06-24 PROCEDURE — 83036 HEMOGLOBIN GLYCOSYLATED A1C: CPT | Performed by: FAMILY MEDICINE

## 2022-06-24 PROCEDURE — 3051F HG A1C>EQUAL 7.0%<8.0%: CPT | Performed by: FAMILY MEDICINE

## 2022-06-24 PROCEDURE — 99396 PREV VISIT EST AGE 40-64: CPT | Performed by: FAMILY MEDICINE

## 2022-06-24 RX ORDER — TIZANIDINE 4 MG/1
4 TABLET ORAL 2 TIMES DAILY PRN
Qty: 20 TABLET | Refills: 0 | Status: SHIPPED | OUTPATIENT
Start: 2022-06-24 | End: 2022-09-28

## 2022-06-24 RX ORDER — BLOOD-GLUCOSE METER
KIT MISCELLANEOUS
Qty: 100 EACH | Refills: 5 | Status: SHIPPED | OUTPATIENT
Start: 2022-06-24 | End: 2022-09-28 | Stop reason: SDUPTHER

## 2022-06-24 NOTE — ASSESSMENT & PLAN NOTE
He is due for colonoscopy; declined.  Has regularly declined these in the past.  He is due for prostate cancer screening; ESTUARDO declined but he is willing to get PSA done at his next lab draw in 3 months.  He is due for lung cancer screening; declined.  He is up-to-date on COVID (x2, due for shot #3 - declined), Tdap (10/2012).  He is due for Prevnar 20, Shingrix.  Prevnar declined but he might be willing to get this in the fall.  Flu shot is not currently dictated.  Labs are up-to-date.

## 2022-06-24 NOTE — ASSESSMENT & PLAN NOTE
Blood pressure is running just above goal today.  Continue to monitor closely.  He has been doing well recently.  Maxed out on amlodipine, lisinopril and HCTZ, so if he is continuing to run high at next visit, we will need to add in a fourth agent.  Labs reviewed and up-to-date.

## 2022-06-24 NOTE — PROGRESS NOTES
"Chief Complaint  Annual Exam    Subjective          Cleve Ibarra presents to [facility] today for his annual physical.  He is due for colonoscopy.  Has regularly declined these in the past.  He is due for prostate cancer screening.  He is due for lung cancer screening.  He is up-to-date on COVID (x2, due for shot #3), Tdap (10/2012).  He is due for Prevnar 20, Shingrix.  Flu shot is not currently dictated.    He is having stabbing pains in the feet, but not concomitantly.  It started about a month ago.  He has tried numerous OTC remedies.  If he can catch it early (was originally using ibuprofen and lidocaine spray), it is not too bad, but if it gets going, it lasts all day.  Sharp, stabbing pain that starts on the middle of the top of the foot and then spreads over the whole top of the foot.  It seems to come when he moves a certain way and \"pulls the muscle.\"      He is on metformin for diabetes.  He is due for eye exam; going to see Dr. Gonzales in the near future (he wants to get some money together first).  He is up-to-date on foot exam, done 3/2022.  Last A1c was 10.7 in March.  At that time, we attempted to add a sulfonylurea, but unfortunately were unable to contact him.    He is on amlodipine, hydrochlorothiazide and lisinopril for hypertension.  He denies with chest pain, palpitations or shortness of air.    He has not currently on anything for depression or anxiety.  Seroquel caused jitteriness. He has been on Valium in the past but we have discussed that this is not a good medication for long-term use and it was discontinued.     Review of Systems   Constitutional: Negative for chills, fatigue and fever.   HENT: Negative for congestion, hearing loss and rhinorrhea.    Eyes: Negative for pain and visual disturbance.   Respiratory: Negative for cough and shortness of breath.    Cardiovascular: Negative for chest pain and palpitations.        +bulging varicosities -- " "wearing compression stockings   Gastrointestinal: Negative for abdominal pain, constipation, diarrhea, nausea and vomiting.   Genitourinary: Negative for dysuria and hematuria.   Musculoskeletal: Negative for arthralgias and myalgias.        +foot pain   Skin: Negative for rash.   Neurological: Negative for weakness and numbness.   Psychiatric/Behavioral: Negative for dysphoric mood and sleep disturbance. The patient is not nervous/anxious.          Current Outpatient Medications:   •  amLODIPine (NORVASC) 10 MG tablet, Take 1 tablet by mouth Daily., Disp: 90 tablet, Rfl: 1  •  FREESTYLE LITE test strip, Test once daily, Disp: 100 each, Rfl: 5  •  hydroCHLOROthiazide (HYDRODIURIL) 25 MG tablet, Take 1 tablet by mouth 2 (Two) Times a Day., Disp: 180 tablet, Rfl: 1  •  lisinopril (PRINIVIL,ZESTRIL) 20 MG tablet, Take 1 tablet by mouth 2 (Two) Times a Day., Disp: 180 tablet, Rfl: 1  •  metFORMIN (GLUCOPHAGE) 1000 MG tablet, Take 1 tablet by mouth 2 (Two) Times a Day With Meals., Disp: 180 tablet, Rfl: 1  •  Blood Glucose Monitoring Suppl (FreeStyle Lite) device, , Disp: , Rfl:   •  Diclofenac Sodium (VOLTAREN) 1 % gel gel, Apply 4 g topically to the appropriate area as directed 4 (Four) Times a Day As Needed for arthritis., Disp: 100 g, Rfl: 2  •  Lancets (freestyle) lancets, Check blood sugar 1 to 2 times per day as instructed. (Patient taking differently: Check blood sugar 1 to 2 times per day as instructed.), Disp: 100 each, Rfl: 3  •  tiZANidine (ZANAFLEX) 4 MG tablet, Take 1 tablet by mouth 2 (Two) Times a Day As Needed for Muscle Spasms., Disp: 20 tablet, Rfl: 0    Allergies:  Clonidine hcl, Fluoxetine, Sertraline, and Venlafaxine      Objective   Vital Signs:   Vitals:    06/24/22 0929 06/24/22 1011   BP: 157/63 149/59   BP Location: Right arm Right arm   Patient Position: Sitting Sitting   Pulse: 56 54   Weight: 85.9 kg (189 lb 6.4 oz)    Height: 188 cm (74.02\")      Physical Exam  Vitals reviewed. "   Constitutional:       General: He is not in acute distress.     Appearance: Normal appearance. He is well-developed.   HENT:      Head: Normocephalic and atraumatic.      Right Ear: External ear normal.      Left Ear: External ear normal.      Mouth/Throat:      Mouth: Mucous membranes are moist.   Eyes:      Extraocular Movements: Extraocular movements intact.      Conjunctiva/sclera: Conjunctivae normal.      Pupils: Pupils are equal, round, and reactive to light.   Cardiovascular:      Rate and Rhythm: Normal rate and regular rhythm.      Heart sounds: No murmur heard.  Pulmonary:      Effort: Pulmonary effort is normal.      Breath sounds: Normal breath sounds. No wheezing, rhonchi or rales.   Abdominal:      General: Bowel sounds are normal. There is no distension.      Palpations: Abdomen is soft.      Tenderness: There is no abdominal tenderness.   Musculoskeletal:         General: Normal range of motion.   Skin:     General: Skin is warm and dry.   Neurological:      Mental Status: He is alert and oriented to person, place, and time.      Deep Tendon Reflexes: Reflexes normal.   Psychiatric:         Mood and Affect: Mood and affect normal.         Behavior: Behavior normal.         Thought Content: Thought content normal.         Judgment: Judgment normal.             Assessment and Plan    Diagnoses and all orders for this visit:    1. Annual physical exam (Primary)  Assessment & Plan:  He is due for colonoscopy; declined.  Has regularly declined these in the past.  He is due for prostate cancer screening; ESTUARDO declined but he is willing to get PSA done at his next lab draw in 3 months.  He is due for lung cancer screening; declined.  He is up-to-date on COVID (x2, due for shot #3 - declined), Tdap (10/2012).  He is due for Prevnar 20, Shingrix.  Prevnar declined but he might be willing to get this in the fall.  Flu shot is not currently dictated.  Labs are up-to-date.      2. Type 2 diabetes mellitus with  diabetic polyneuropathy, without long-term current use of insulin ([facility])  Assessment & Plan:  He is on metformin for diabetes.  He is due for eye exam; going to see Dr. Gonzales in the near future (he wants to get some money together first).  He is up-to-date on foot exam, done 3/2022.  Last A1c was 10.7 in March.  At that time, we attempted to add a sulfonylurea, but unfortunately were unable to contact him.  We will recheck an A1c fingerstick today before making a decision about whether to add, but he is willing to start a sulfonylurea if we need to do that.    Orders:  -     POC Glycosylated Hemoglobin (Hb A1C)  -     FREESTYLE LITE test strip; Test once daily  Dispense: 100 each; Refill: 5    3. Primary hypertension  Assessment & Plan:  Blood pressure is running just above goal today.  Continue to monitor closely.  He has been doing well recently.  Maxed out on amlodipine, lisinopril and HCTZ, so if he is continuing to run high at next visit, we will need to add in a fourth agent.  Labs reviewed and up-to-date.      4. Mixed hyperlipidemia  Assessment & Plan:  Unable to tolerate statins.      5. Major depressive disorder, recurrent episode, moderate degree ([facility])  Assessment & Plan:  Mood appears stable with bright affect today.  Not currently on antidepressants.  Continue to monitor.      6. Anxiety  Assessment & Plan:  As per depression plan.      7. Pain in both feet  Assessment & Plan:  Bilateral foot pain appears consistent by history with muscle spasm.  Prescription sent for diclofenac gel as he would like something topical to try but I will also send in some muscle relaxers that he can use in a pinch.    Orders:  -     Diclofenac Sodium (VOLTAREN) 1 % gel gel; Apply 4 g topically to the appropriate area as directed 4 (Four) Times a Day As Needed for arthritis.  Dispense: 100 g; Refill: 2  -     tiZANidine (ZANAFLEX) 4 MG tablet; Take 1 tablet by mouth 2 (Two) Times a Day As Needed for Muscle Spasms.   Dispense: 20 tablet; Refill: 0      Follow Up   Return in about 3 months (around 9/24/2022) for Recheck.  Patient was given instructions and counseling regarding his condition or for health maintenance advice. Please see specific information pulled into the AVS if appropriate.

## 2022-06-24 NOTE — ASSESSMENT & PLAN NOTE
Bilateral foot pain appears consistent by history with muscle spasm.  Prescription sent for diclofenac gel as he would like something topical to try but I will also send in some muscle relaxers that he can use in a pinch.

## 2022-06-24 NOTE — ASSESSMENT & PLAN NOTE
He is on metformin for diabetes.  He is due for eye exam; going to see Dr. Gonzales in the near future (he wants to get some money together first).  He is up-to-date on foot exam, done 3/2022.  Last A1c was 10.7 in March.  At that time, we attempted to add a sulfonylurea, but unfortunately were unable to contact him.  We will recheck an A1c fingerstick today before making a decision about whether to add, but he is willing to start a sulfonylurea if we need to do that.

## 2022-06-24 NOTE — ASSESSMENT & PLAN NOTE
Mood appears stable with bright affect today.  Not currently on antidepressants.  Continue to monitor.

## 2022-09-15 DIAGNOSIS — I10 PRIMARY HYPERTENSION: ICD-10-CM

## 2022-09-15 RX ORDER — LISINOPRIL 20 MG/1
20 TABLET ORAL 2 TIMES DAILY
Qty: 180 TABLET | Refills: 1 | Status: SHIPPED | OUTPATIENT
Start: 2022-09-15 | End: 2023-01-03 | Stop reason: SDUPTHER

## 2022-09-28 ENCOUNTER — OFFICE VISIT (OUTPATIENT)
Dept: FAMILY MEDICINE CLINIC | Age: 62
End: 2022-09-28

## 2022-09-28 ENCOUNTER — HOSPITAL ENCOUNTER (OUTPATIENT)
Dept: ULTRASOUND IMAGING | Facility: HOSPITAL | Age: 62
Discharge: HOME | End: 2022-09-28

## 2022-09-28 ENCOUNTER — LAB (OUTPATIENT)
Dept: LAB | Facility: HOSPITAL | Age: 62
End: 2022-09-28

## 2022-09-28 VITALS
WEIGHT: 192.2 LBS | HEART RATE: 76 BPM | SYSTOLIC BLOOD PRESSURE: 148 MMHG | TEMPERATURE: 98 F | HEIGHT: 74 IN | DIASTOLIC BLOOD PRESSURE: 61 MMHG | BODY MASS INDEX: 24.67 KG/M2

## 2022-09-28 DIAGNOSIS — R60.0 LOCALIZED EDEMA: Primary | ICD-10-CM

## 2022-09-28 DIAGNOSIS — R60.0 LOCALIZED EDEMA: ICD-10-CM

## 2022-09-28 DIAGNOSIS — E11.42 TYPE 2 DIABETES MELLITUS WITH DIABETIC POLYNEUROPATHY, WITHOUT LONG-TERM CURRENT USE OF INSULIN: ICD-10-CM

## 2022-09-28 DIAGNOSIS — Z23 ENCOUNTER FOR IMMUNIZATION: ICD-10-CM

## 2022-09-28 DIAGNOSIS — I10 PRIMARY HYPERTENSION: ICD-10-CM

## 2022-09-28 DIAGNOSIS — R97.20 ELEVATED PSA, GREATER THAN OR EQUAL TO 20 NG/ML: Primary | ICD-10-CM

## 2022-09-28 DIAGNOSIS — Z12.5 PROSTATE CANCER SCREENING: ICD-10-CM

## 2022-09-28 DIAGNOSIS — I82.4Y1 ACUTE DEEP VEIN THROMBOSIS (DVT) OF PROXIMAL VEIN OF RIGHT LOWER EXTREMITY: ICD-10-CM

## 2022-09-28 DIAGNOSIS — E78.2 MIXED HYPERLIPIDEMIA: ICD-10-CM

## 2022-09-28 DIAGNOSIS — F33.1 MAJOR DEPRESSIVE DISORDER, RECURRENT EPISODE, MODERATE DEGREE: ICD-10-CM

## 2022-09-28 DIAGNOSIS — F41.9 ANXIETY: ICD-10-CM

## 2022-09-28 PROBLEM — Z00.00 ANNUAL PHYSICAL EXAM: Status: RESOLVED | Noted: 2022-06-24 | Resolved: 2022-09-28

## 2022-09-28 LAB
ALBUMIN SERPL-MCNC: 4.5 G/DL (ref 3.5–5.2)
ALBUMIN/GLOB SERPL: 2.1 G/DL
ALP SERPL-CCNC: 227 U/L (ref 39–117)
ALT SERPL W P-5'-P-CCNC: 10 U/L (ref 1–41)
ANION GAP SERPL CALCULATED.3IONS-SCNC: 11.6 MMOL/L (ref 5–15)
AST SERPL-CCNC: 11 U/L (ref 1–40)
BILIRUB SERPL-MCNC: 0.3 MG/DL (ref 0–1.2)
BUN SERPL-MCNC: 36 MG/DL (ref 8–23)
BUN/CREAT SERPL: 25.4 (ref 7–25)
CALCIUM SPEC-SCNC: 9.9 MG/DL (ref 8.6–10.5)
CHLORIDE SERPL-SCNC: 103 MMOL/L (ref 98–107)
CHOLEST SERPL-MCNC: 214 MG/DL (ref 0–200)
CO2 SERPL-SCNC: 24.4 MMOL/L (ref 22–29)
CREAT SERPL-MCNC: 1.42 MG/DL (ref 0.76–1.27)
EGFRCR SERPLBLD CKD-EPI 2021: 55.5 ML/MIN/1.73
GLOBULIN UR ELPH-MCNC: 2.1 GM/DL
GLUCOSE SERPL-MCNC: 172 MG/DL (ref 65–99)
HBA1C MFR BLD: 7.5 % (ref 4.8–5.6)
HDLC SERPL-MCNC: 34 MG/DL (ref 40–60)
LDLC SERPL CALC-MCNC: 139 MG/DL (ref 0–100)
LDLC/HDLC SERPL: 3.96 {RATIO}
POTASSIUM SERPL-SCNC: 4.9 MMOL/L (ref 3.5–5.2)
PROT SERPL-MCNC: 6.6 G/DL (ref 6–8.5)
PSA SERPL-MCNC: 86.7 NG/ML (ref 0–4)
SODIUM SERPL-SCNC: 139 MMOL/L (ref 136–145)
TRIGL SERPL-MCNC: 227 MG/DL (ref 0–150)
VLDLC SERPL-MCNC: 41 MG/DL (ref 5–40)

## 2022-09-28 PROCEDURE — 36415 COLL VENOUS BLD VENIPUNCTURE: CPT

## 2022-09-28 PROCEDURE — 99214 OFFICE O/P EST MOD 30 MIN: CPT | Performed by: FAMILY MEDICINE

## 2022-09-28 PROCEDURE — 80061 LIPID PANEL: CPT

## 2022-09-28 PROCEDURE — 90677 PCV20 VACCINE IM: CPT | Performed by: FAMILY MEDICINE

## 2022-09-28 PROCEDURE — 93971 EXTREMITY STUDY: CPT

## 2022-09-28 PROCEDURE — 90471 IMMUNIZATION ADMIN: CPT | Performed by: FAMILY MEDICINE

## 2022-09-28 PROCEDURE — G0103 PSA SCREENING: HCPCS

## 2022-09-28 PROCEDURE — 80053 COMPREHEN METABOLIC PANEL: CPT

## 2022-09-28 PROCEDURE — 83036 HEMOGLOBIN GLYCOSYLATED A1C: CPT

## 2022-09-28 RX ORDER — AMLODIPINE BESYLATE 10 MG/1
10 TABLET ORAL DAILY
Qty: 90 TABLET | Refills: 1 | Status: SHIPPED | OUTPATIENT
Start: 2022-09-28 | End: 2023-01-03

## 2022-09-28 RX ORDER — RIVAROXABAN 15 MG-20MG
KIT ORAL
Qty: 51 EACH | Refills: 0 | Status: SHIPPED | OUTPATIENT
Start: 2022-09-28 | End: 2022-09-28 | Stop reason: SDUPTHER

## 2022-09-28 RX ORDER — BLOOD-GLUCOSE METER
KIT MISCELLANEOUS
Qty: 100 EACH | Refills: 5 | Status: SHIPPED | OUTPATIENT
Start: 2022-09-28 | End: 2023-03-31 | Stop reason: SDUPTHER

## 2022-09-28 RX ORDER — BUSPIRONE HYDROCHLORIDE 10 MG/1
10 TABLET ORAL 2 TIMES DAILY PRN
Qty: 30 TABLET | Refills: 0 | Status: SHIPPED | OUTPATIENT
Start: 2022-09-28 | End: 2023-01-03

## 2022-09-28 NOTE — ASSESSMENT & PLAN NOTE
Please call Ema Huertas with [facility] to discuss pt - she has been treating the patient for 10 years      Not life and death but pt has been very emotionally unstable    Please call  Is aware Dr Armin Orozco is gone for the day but would like to speak with her if at all possible today Prescription sent in for buspirone that he can use for now while he is dealing with this new diagnosis of DVT as well as his friend's poor health.

## 2022-09-28 NOTE — PROGRESS NOTES
"Chief Complaint  Diabetes (3 month follow up)    Subjective          Cleve Ibarra presents to [facility] today for follow-up of chronic issues.    His good friend Lanny Persaud (also my pt) is currently hospitalized up at [facility] and \"they're not sure she's going to make it.\"  He is anxious to get up there today.    He has noticed asymmetric RLE swelling for the past 3 weeks.  Some calf discomfort.      He is on metformin for diabetes.  He is due for eye exam; there are some financial barriers that have been preventing him from completing that.  He is up-to-date on foot exam, done 3/2022.  Last A1c was 7.8 in June.     He is on amlodipine, HCTZ and lisinopril for hypertension.  He denies with chest pain, palpitations or shortness of breath.     He has not currently on anything for depression or anxiety.  Seroquel caused jitteriness. He has been on Valium in the past but we have discussed that this is not a good medication for long-term use and it was discontinued.       Current Outpatient Medications:   •  amLODIPine (NORVASC) 10 MG tablet, Take 1 tablet by mouth Daily., Disp: 90 tablet, Rfl: 1  •  Blood Glucose Monitoring Suppl (FreeStyle Lite) device, , Disp: , Rfl:   •  FREESTYLE LITE test strip, Test once daily, Disp: 100 each, Rfl: 5  •  hydroCHLOROthiazide (HYDRODIURIL) 25 MG tablet, Take 1 tablet by mouth 2 (Two) Times a Day., Disp: 180 tablet, Rfl: 1  •  Lancets (freestyle) lancets, Check blood sugar 1 to 2 times per day as instructed. (Patient taking differently: Check blood sugar 1 to 2 times per day as instructed.), Disp: 100 each, Rfl: 3  •  lisinopril (PRINIVIL,ZESTRIL) 20 MG tablet, Take 1 tablet by mouth 2 (Two) Times a Day., Disp: 180 tablet, Rfl: 1  •  metFORMIN (GLUCOPHAGE) 1000 MG tablet, Take 1 tablet by mouth 2 (Two) Times a Day With Meals., Disp: 180 tablet, Rfl: 1  •  busPIRone (BUSPAR) 10 MG tablet, Take 1 tablet by mouth 2 (Two) Times a Day As Needed " "(anxiety)., Disp: 30 tablet, Rfl: 0  •  rivaroxaban (Xarelto) 15 MG tablet, Take 1 tablet by mouth 2 (Two) Times a Day for 21 days. Then start Xarelto 20 mg daily thereafter, Disp: 42 tablet, Rfl: 0  •  rivaroxaban (Xarelto) 20 MG tablet, Take 1 tablet by mouth Daily., Disp: 30 tablet, Rfl: 1    Allergies:  Clonidine hcl, Fluoxetine, Sertraline, and Venlafaxine      Objective   Vital Signs:   Vitals:    09/28/22 0758 09/28/22 0844   BP: 154/67 148/61   BP Location: Right arm Right arm   Patient Position: Sitting Sitting   Cuff Size:  Adult   Pulse: 76    Temp: 98 °F (36.7 °C)    TempSrc: Oral    Weight: 87.2 kg (192 lb 3.2 oz)    Height: 188 cm (74.02\")        Physical Exam  Vitals reviewed.   Constitutional:       General: He is not in acute distress.     Appearance: Normal appearance. He is well-developed.   HENT:      Head: Normocephalic and atraumatic.      Right Ear: External ear normal.      Left Ear: External ear normal.   Eyes:      Extraocular Movements: Extraocular movements intact.      Conjunctiva/sclera: Conjunctivae normal.      Pupils: Pupils are equal, round, and reactive to light.   Cardiovascular:      Rate and Rhythm: Normal rate and regular rhythm.      Heart sounds: No murmur heard.  Pulmonary:      Effort: Pulmonary effort is normal.      Breath sounds: Normal breath sounds. No wheezing, rhonchi or rales.   Abdominal:      General: Bowel sounds are normal. There is no distension.      Palpations: Abdomen is soft.      Tenderness: There is no abdominal tenderness.   Musculoskeletal:         General: Normal range of motion.      Right lower leg: Edema (2+) present.      Left lower leg: No edema.      Comments: Negative Jovita sign, mild calf tenderness   Neurological:      Mental Status: He is alert.   Psychiatric:         Mood and Affect: Affect normal.          Lab Results   Component Value Date    GLUCOSE 240 (H) 03/25/2022    BUN 31 (H) 03/25/2022    CREATININE 1.26 03/25/2022    BCR 24.6 " 03/25/2022    K 5.1 03/25/2022    CO2 25.0 03/25/2022    CALCIUM 9.9 03/25/2022    ALBUMIN 4.70 03/25/2022    LABIL2 1.6 05/18/2021    AST 17 03/25/2022    ALT 20 03/25/2022       Lab Results   Component Value Date    CHOL 243 (H) 03/25/2022    CHLPL 205 (H) 05/18/2021    TRIG 202 (H) 03/25/2022    HDL 37 (L) 03/25/2022     (H) 03/25/2022            Assessment and Plan    Diagnoses and all orders for this visit:    1. Localized edema (Primary)  Assessment & Plan:  Left lower extremity Doppler came back showing subacute DVT.  Started him on Xarelto starter pack as below.  He is hemodynamically stable.  Plan for referral to Heme-Onc to see how long he needs to continue on the medication but he is not able to do that right now as his friend to seriously ill in the hospital.    Orders:  -     US Venous Doppler Lower Extremity Right (duplex); Future    2. Type 2 diabetes mellitus with diabetic polyneuropathy, without long-term current use of insulin (HCC)  Assessment & Plan:  He is on metformin for diabetes.  No refills needed.  He is due for eye exam; there are some financial barriers that have been preventing him from completing that.  He is up-to-date on foot exam, done 3/2022.  Last A1c was 7.8 in June.  Checking labs.    Orders:  -     Comprehensive Metabolic Panel; Future  -     Lipid Panel; Future  -     Hemoglobin A1c; Future  -     FREESTYLE LITE test strip; Test once daily  Dispense: 100 each; Refill: 5    3. Primary hypertension  Assessment & Plan:  BP is elevated today but he reports it has been running at goal at home.  Continue amlodipine, HCTZ 25 mg BID and lisinopril 20 mg BID.  Rx sent as below.  Checking labs.    Orders:  -     amLODIPine (NORVASC) 10 MG tablet; Take 1 tablet by mouth Daily.  Dispense: 90 tablet; Refill: 1    4. Mixed hyperlipidemia  Assessment & Plan:  Has not tolerated statins in the past.        5. Major depressive disorder, recurrent episode, moderate degree (HCC)  Assessment  & Plan:  Not currently on medications.  Continue to monitor.  Benzos are what he has primarily used in the past but we have discussed that these are not optimal.      6. Prostate cancer screening  -     PSA Screen; Future    7. Encounter for immunization  -     Pneumococcal Conjugate Vaccine 20-Valent All    8. Acute deep vein thrombosis (DVT) of proximal vein of right lower extremity (HCC)  -     Discontinue: Rivaroxaban (Xarelto Starter Pack) tablet therapy pack starter pack; Take one 15 mg tablet twice daily with food for 21 days.  Followed by one 20 mg tablet by mouth once daily with food. Take as directed  Dispense: 51 each; Refill: 0  -     rivaroxaban (Xarelto) 15 MG tablet; Take 1 tablet by mouth 2 (Two) Times a Day for 21 days. Then start Xarelto 20 mg daily thereafter  Dispense: 42 tablet; Refill: 0  -     rivaroxaban (Xarelto) 20 MG tablet; Take 1 tablet by mouth Daily.  Dispense: 30 tablet; Refill: 1    9. Anxiety  Assessment & Plan:  Prescription sent in for buspirone that he can use for now while he is dealing with this new diagnosis of DVT as well as his friend's poor health.    Orders:  -     busPIRone (BUSPAR) 10 MG tablet; Take 1 tablet by mouth 2 (Two) Times a Day As Needed (anxiety).  Dispense: 30 tablet; Refill: 0      Follow Up   Return in about 3 months (around 12/28/2022) for Recheck.  Patient was given instructions and counseling regarding his condition or for health maintenance advice. Please see specific information pulled into the AVS if appropriate.

## 2022-09-28 NOTE — ASSESSMENT & PLAN NOTE
He is on metformin for diabetes.  No refills needed.  He is due for eye exam; there are some financial barriers that have been preventing him from completing that.  He is up-to-date on foot exam, done 3/2022.  Last A1c was 7.8 in June.  Checking labs.

## 2022-09-28 NOTE — ASSESSMENT & PLAN NOTE
Left lower extremity Doppler came back showing subacute DVT.  Started him on Xarelto starter pack as below.  He is hemodynamically stable.  Plan for referral to Heme-Onc to see how long he needs to continue on the medication but he is not able to do that right now as his friend to seriously ill in the hospital.

## 2022-09-28 NOTE — ASSESSMENT & PLAN NOTE
Not currently on medications.  Continue to monitor.  Benzos are what he has primarily used in the past but we have discussed that these are not optimal.

## 2022-09-28 NOTE — ASSESSMENT & PLAN NOTE
BP is elevated today but he reports it has been running at goal at home.  Continue amlodipine, HCTZ 25 mg BID and lisinopril 20 mg BID.  Rx sent as below.  Checking labs.

## 2022-09-30 RX ORDER — ROSUVASTATIN CALCIUM 5 MG/1
5 TABLET, COATED ORAL NIGHTLY
Qty: 30 TABLET | Refills: 5 | Status: SHIPPED | OUTPATIENT
Start: 2022-09-30 | End: 2023-01-19

## 2022-10-03 ENCOUNTER — TELEPHONE (OUTPATIENT)
Dept: UROLOGY | Facility: CLINIC | Age: 62
End: 2022-10-03

## 2022-10-03 NOTE — TELEPHONE ENCOUNTER
CALLED PT TO SCHEDULED APPT W/ ON 10/5/22/PT IS DECLINING APPT RIGHT NOW BECAUSE A FRIEND IS IN THE HOSPITAL IN SERIOUS CONDITION/ INFORMING REFERRING PROVIDER

## 2022-10-11 NOTE — TELEPHONE ENCOUNTER
I know Cleve's friend is quite ill and he needs to be with her right now, but this elevated PSA is also quite serious.  Please call him back in a week to see if he is able to schedule with Urology (his friend has been transitioned to comfort care at the hospital as of the past few days).  Thanks, TIP

## 2022-10-12 NOTE — TELEPHONE ENCOUNTER
"Spoke with pt and he states Lanny , but he is not going back to that office that called him because he said the lady on the phone was hateful to him when he refused to make the appt.  I told him I can set him up with another provider if he would like and he said NO he was tired of messing with these doctors.  I informed him of how serious this was and that he could possibly have prostate CA. He then stated \"if he does then he does and he will deal with it later\".  I then explained that if it was prostate CA, early treatment gives the best outcomes and that he may not have \"later\" to deal with it and pt still refused. I then informed him as long as he knew the risk he was taking by not seeking medical treatment and how serious this situation was I could not make him go.  He verbalized understanding and still refused at this time. I told him to call back if he changes his mind.  "

## 2022-10-12 NOTE — TELEPHONE ENCOUNTER
I am very sorry to hear that.  I hope he will change his mind.  We are ready to assist him in getting to any Urology office with which she feels comfortable as soon as he is willing.  Thanks,  TIP

## 2022-10-18 ENCOUNTER — TELEPHONE (OUTPATIENT)
Dept: FAMILY MEDICINE CLINIC | Age: 62
End: 2022-10-18

## 2022-10-18 DIAGNOSIS — R94.4 DECREASED CREATININE CLEARANCE: Primary | ICD-10-CM

## 2022-11-02 ENCOUNTER — LAB (OUTPATIENT)
Dept: LAB | Facility: HOSPITAL | Age: 62
End: 2022-11-02

## 2022-11-02 DIAGNOSIS — R94.4 DECREASED CREATININE CLEARANCE: ICD-10-CM

## 2022-11-02 LAB
ALBUMIN SERPL-MCNC: 3.9 G/DL (ref 3.5–5.2)
ALBUMIN/GLOB SERPL: 1.6 G/DL
ALP SERPL-CCNC: 252 U/L (ref 39–117)
ALT SERPL W P-5'-P-CCNC: 12 U/L (ref 1–41)
ANION GAP SERPL CALCULATED.3IONS-SCNC: 7.9 MMOL/L (ref 5–15)
AST SERPL-CCNC: 13 U/L (ref 1–40)
BILIRUB SERPL-MCNC: 0.3 MG/DL (ref 0–1.2)
BUN SERPL-MCNC: 21 MG/DL (ref 8–23)
BUN/CREAT SERPL: 16.7 (ref 7–25)
CALCIUM SPEC-SCNC: 9.3 MG/DL (ref 8.6–10.5)
CHLORIDE SERPL-SCNC: 106 MMOL/L (ref 98–107)
CO2 SERPL-SCNC: 25.1 MMOL/L (ref 22–29)
CREAT SERPL-MCNC: 1.26 MG/DL (ref 0.76–1.27)
EGFRCR SERPLBLD CKD-EPI 2021: 64.1 ML/MIN/1.73
GLOBULIN UR ELPH-MCNC: 2.5 GM/DL
GLUCOSE SERPL-MCNC: 137 MG/DL (ref 65–99)
POTASSIUM SERPL-SCNC: 4.4 MMOL/L (ref 3.5–5.2)
PROT SERPL-MCNC: 6.4 G/DL (ref 6–8.5)
SODIUM SERPL-SCNC: 139 MMOL/L (ref 136–145)

## 2022-11-02 PROCEDURE — 80053 COMPREHEN METABOLIC PANEL: CPT

## 2022-11-02 PROCEDURE — 36415 COLL VENOUS BLD VENIPUNCTURE: CPT

## 2022-11-22 RX ORDER — HYDROCHLOROTHIAZIDE 25 MG/1
25 TABLET ORAL 2 TIMES DAILY
Qty: 180 TABLET | Refills: 1 | Status: SHIPPED | OUTPATIENT
Start: 2022-11-22 | End: 2023-01-03

## 2023-01-01 ENCOUNTER — TELEPHONE (OUTPATIENT)
Dept: FAMILY MEDICINE CLINIC | Age: 63
End: 2023-01-01

## 2023-01-01 DIAGNOSIS — C61 PROSTATE CANCER METASTATIC TO BONE: Primary | ICD-10-CM

## 2023-01-01 DIAGNOSIS — C79.51 PROSTATE CANCER METASTATIC TO BONE: Primary | ICD-10-CM

## 2023-01-01 RX ORDER — SCOLOPAMINE TRANSDERMAL SYSTEM 1 MG/1
1 PATCH, EXTENDED RELEASE TRANSDERMAL
Qty: 5 PATCH | Refills: 0 | Status: SHIPPED | OUTPATIENT
Start: 2023-01-01

## 2023-01-01 RX ORDER — HYDROCODONE BITARTRATE AND ACETAMINOPHEN 5; 325 MG/1; MG/1
1 TABLET ORAL EVERY 4 HOURS PRN
Qty: 30 TABLET | Refills: 0 | Status: SHIPPED | OUTPATIENT
Start: 2023-01-01

## 2023-01-03 ENCOUNTER — LAB (OUTPATIENT)
Dept: LAB | Facility: HOSPITAL | Age: 63
End: 2023-01-03
Payer: MEDICAID

## 2023-01-03 ENCOUNTER — OFFICE VISIT (OUTPATIENT)
Dept: FAMILY MEDICINE CLINIC | Age: 63
End: 2023-01-03
Payer: MEDICAID

## 2023-01-03 ENCOUNTER — HOSPITAL ENCOUNTER (OUTPATIENT)
Dept: GENERAL RADIOLOGY | Facility: HOSPITAL | Age: 63
Discharge: HOME | End: 2023-01-03
Payer: MEDICAID

## 2023-01-03 VITALS
WEIGHT: 170.2 LBS | HEART RATE: 82 BPM | BODY MASS INDEX: 21.84 KG/M2 | HEIGHT: 74 IN | SYSTOLIC BLOOD PRESSURE: 134 MMHG | DIASTOLIC BLOOD PRESSURE: 68 MMHG

## 2023-01-03 DIAGNOSIS — E11.42 TYPE 2 DIABETES MELLITUS WITH DIABETIC POLYNEUROPATHY, WITHOUT LONG-TERM CURRENT USE OF INSULIN: ICD-10-CM

## 2023-01-03 DIAGNOSIS — M25.551 RIGHT HIP PAIN: ICD-10-CM

## 2023-01-03 DIAGNOSIS — C79.51 PROSTATE CANCER METASTATIC TO BONE: Primary | ICD-10-CM

## 2023-01-03 DIAGNOSIS — E78.2 MIXED HYPERLIPIDEMIA: ICD-10-CM

## 2023-01-03 DIAGNOSIS — K21.9 GASTROESOPHAGEAL REFLUX DISEASE WITHOUT ESOPHAGITIS: ICD-10-CM

## 2023-01-03 DIAGNOSIS — F33.1 MAJOR DEPRESSIVE DISORDER, RECURRENT EPISODE, MODERATE DEGREE: ICD-10-CM

## 2023-01-03 DIAGNOSIS — I82.4Y1 ACUTE DEEP VEIN THROMBOSIS (DVT) OF PROXIMAL VEIN OF RIGHT LOWER EXTREMITY: ICD-10-CM

## 2023-01-03 DIAGNOSIS — I10 PRIMARY HYPERTENSION: ICD-10-CM

## 2023-01-03 DIAGNOSIS — C61 PROSTATE CANCER METASTATIC TO BONE: Primary | ICD-10-CM

## 2023-01-03 LAB
ALBUMIN SERPL-MCNC: 4.6 G/DL (ref 3.5–5.2)
ALBUMIN/GLOB SERPL: 1.7 G/DL
ALP SERPL-CCNC: 302 U/L (ref 39–117)
ALT SERPL W P-5'-P-CCNC: 8 U/L (ref 1–41)
ANION GAP SERPL CALCULATED.3IONS-SCNC: 11 MMOL/L (ref 5–15)
AST SERPL-CCNC: 13 U/L (ref 1–40)
BILIRUB SERPL-MCNC: 0.4 MG/DL (ref 0–1.2)
BUN SERPL-MCNC: 31 MG/DL (ref 8–23)
BUN/CREAT SERPL: 19.3 (ref 7–25)
CALCIUM SPEC-SCNC: 10.2 MG/DL (ref 8.6–10.5)
CHLORIDE SERPL-SCNC: 96 MMOL/L (ref 98–107)
CO2 SERPL-SCNC: 31 MMOL/L (ref 22–29)
CREAT SERPL-MCNC: 1.61 MG/DL (ref 0.76–1.27)
EGFRCR SERPLBLD CKD-EPI 2021: 47.8 ML/MIN/1.73
GLOBULIN UR ELPH-MCNC: 2.7 GM/DL
GLUCOSE SERPL-MCNC: 181 MG/DL (ref 65–99)
HBA1C MFR BLD: 7.3 % (ref 4.8–5.6)
POTASSIUM SERPL-SCNC: 4.2 MMOL/L (ref 3.5–5.2)
PROT SERPL-MCNC: 7.3 G/DL (ref 6–8.5)
SODIUM SERPL-SCNC: 138 MMOL/L (ref 136–145)

## 2023-01-03 PROCEDURE — 1159F MED LIST DOCD IN RCRD: CPT | Performed by: FAMILY MEDICINE

## 2023-01-03 PROCEDURE — 73502 X-RAY EXAM HIP UNI 2-3 VIEWS: CPT

## 2023-01-03 PROCEDURE — 99214 OFFICE O/P EST MOD 30 MIN: CPT | Performed by: FAMILY MEDICINE

## 2023-01-03 PROCEDURE — 3075F SYST BP GE 130 - 139MM HG: CPT | Performed by: FAMILY MEDICINE

## 2023-01-03 PROCEDURE — 83036 HEMOGLOBIN GLYCOSYLATED A1C: CPT

## 2023-01-03 PROCEDURE — 36415 COLL VENOUS BLD VENIPUNCTURE: CPT

## 2023-01-03 PROCEDURE — 1160F RVW MEDS BY RX/DR IN RCRD: CPT | Performed by: FAMILY MEDICINE

## 2023-01-03 PROCEDURE — 80053 COMPREHEN METABOLIC PANEL: CPT

## 2023-01-03 PROCEDURE — 3078F DIAST BP <80 MM HG: CPT | Performed by: FAMILY MEDICINE

## 2023-01-03 RX ORDER — OMEPRAZOLE 40 MG/1
40 CAPSULE, DELAYED RELEASE ORAL DAILY
Qty: 30 CAPSULE | Refills: 5 | Status: SHIPPED | OUTPATIENT
Start: 2023-01-03

## 2023-01-03 RX ORDER — LISINOPRIL 20 MG/1
20 TABLET ORAL 2 TIMES DAILY
Qty: 180 TABLET | Refills: 1 | Status: SHIPPED | OUTPATIENT
Start: 2023-01-03

## 2023-01-03 NOTE — ASSESSMENT & PLAN NOTE
Tanvir found to have screening PSA of >86 three months ago.  At that time, we had referred him urgently to Urology but he declined to schedule an appointment at that time because his good friend was dying in the hospital.  Unfortunately, we have been unable to advance him to reschedule in the interim since she passed.  He was seen at the [facility] ED in November at which time he received a CT A/P with contrast which showed a lesion in the left prostate as well as multiple sclerotic lesions throughout the visualized skeleton compatible with metastatic disease.  He and I discussed today that his diagnosis is consistent with prostate disease metastatic to the bone.  We discussed today that if he is not interested in following up with Urology, it would be best for us to call in Hospice.  He states that at this time he is willing to go see a urologist to find out what his treatment options are.  Urgent referral placed to [facility].

## 2023-01-03 NOTE — ASSESSMENT & PLAN NOTE
He has stopped taking his metformin 1000 mg twice daily.  States that he was instructed to do so after being seen in the ED, but I do not see that he had decreased kidney function.  It may be that he was told to stop for couple of days after his CT scan and misunderstood the instructions.  He is frustrated that he is unable to check his blood sugar twice daily, but we discussed that insurance will not pay for strips more than once daily unless he was on insulin.  Checking labs today to see where his A1c stands.  He has been off of the metformin now for about 5 to 6 weeks.

## 2023-01-03 NOTE — ASSESSMENT & PLAN NOTE
He has stopped his Xarelto at home after his last prescription ran out.  We are going to get him restarted on that.  His swelling has decreased significantly.  He is no longer wearing compression stockings.  His hypercoagulability is likely due to malignancy.

## 2023-01-03 NOTE — ASSESSMENT & PLAN NOTE
He was treated with omeprazole in the [place] ED for nausea and vomiting and other reflux symptoms.  I am going to restart that prescription today as he ran out.  He did think it was useful, however.

## 2023-01-03 NOTE — ASSESSMENT & PLAN NOTE
Not currently on medication.  He seems to be coping with the loss of his friend as well as could be expected.  Continue to monitor.   pulse oximetry

## 2023-01-03 NOTE — PROGRESS NOTES
Chief Complaint  Diabetes (3 month follow up)    Subjective          Cleve Ibarra presents to [facility] today for routine follow-up on chronic issues.    He had a PSA done back in September which came back at 86.7.  This was up from a value of 6.92 years ago.  We have been repeatedly attempting to get him to a urologist ever since that time.  However, his good friend Lanny Persaud was having severe health issues and did indeed recently unfortunately pass away.  He told also multiple occasions that he was unable to devote the time to following up on the PSA because he had to be with her.  We have discussed with him that this is very likely to be prostate cancer and that failure to have it properly assessed by the specialist could be life-threatening.  He was seen in the ED at [facility] on 11/14/22 at which time he had a CT A/P with contrast done.  That showed \"probably enhancing lesion in the left prostate gland, concerning for prostate malignancy\" as well \"multiple very sclerotic lesions present throughout the visualized skeleton compatible with metastatic disease.\"  Cleve reports that at that time, the lesions and their import was discussed with him and it was recommended that he follow-up with the [facility] at [facility].  He declined to do so at that time.    Today, he is complaining of R hip pain.  Also noting some \"red spots\" on the R anterior thigh.    He has been having N/V for which he was given omeprazole.  That controlled it pretty well.      He was also diagnosed with subacute DVT back in September seen on left lower extremity Doppler.  He was started on Xarelto.  He is no longer taking that.    He is no longer taking metformin for diabetes.  He is due for eye exam; there are some financial barriers that have been preventing him from completing that.  He is up-to-date on foot exam, done 3/2022.  Last A1c was 7.8 in June.  Due for labs.     He is on lisinopril for  hypertension.    He was previously on amlodipine and HCTZ but has discontinued both of those medications.  Blood pressure today is at goal.  He denies pain, palpitations or shortness of breath.    He was started on rosuvastatin for HLD and the diabetes but has discontinued that.     He has not currently on anything for depression or anxiety.  Seroquel caused jitteriness. He has been on Valium in the past but we have discussed that this is not a good medication for long-term use and it was discontinued.   We have tried him on buspirone but he discontinued that as well.      Current Outpatient Medications:   •  lisinopril (PRINIVIL,ZESTRIL) 20 MG tablet, Take 1 tablet by mouth 2 (Two) Times a Day., Disp: 180 tablet, Rfl: 1  •  rivaroxaban (Xarelto) 20 MG tablet, Take 1 tablet by mouth Daily., Disp: 30 tablet, Rfl: 5  •  FREESTYLE LITE test strip, Test once daily, Disp: 100 each, Rfl: 5  •  omeprazole (priLOSEC) 40 MG capsule, Take 1 capsule by mouth Daily., Disp: 30 capsule, Rfl: 5  •  rosuvastatin (CRESTOR) 5 MG tablet, Take 1 tablet by mouth Every Night., Disp: 30 tablet, Rfl: 5    Allergies:  Clonidine hcl, Fluoxetine, Sertraline, and Venlafaxine      Objective   Vital Signs:   Vitals:    01/03/23 0759   BP: 134/68   BP Location: Left arm   Patient Position: Sitting   Pulse: 82   Weight: 77.2 kg (170 lb 3.2 oz)   Height: 188 cm (74.02\")       Physical Exam  Vitals reviewed.   Constitutional:       General: He is not in acute distress.     Appearance: Normal appearance. He is well-developed.   HENT:      Head: Normocephalic and atraumatic.      Right Ear: External ear normal.      Left Ear: External ear normal.   Eyes:      Extraocular Movements: Extraocular movements intact.      Conjunctiva/sclera: Conjunctivae normal.      Pupils: Pupils are equal, round, and reactive to light.   Cardiovascular:      Rate and Rhythm: Normal rate and regular rhythm.      Heart sounds: No murmur heard.  Pulmonary:      Effort:  Pulmonary effort is normal.      Breath sounds: Normal breath sounds. No wheezing, rhonchi or rales.   Abdominal:      General: Bowel sounds are normal. There is no distension.      Palpations: Abdomen is soft.      Tenderness: There is no abdominal tenderness.   Musculoskeletal:         General: Normal range of motion.   Neurological:      Mental Status: He is alert.   Psychiatric:         Mood and Affect: Affect normal.          Lab Results   Component Value Date    GLUCOSE 137 (H) 11/02/2022    BUN 21 11/02/2022    CREATININE 1.26 11/02/2022    BCR 16.7 11/02/2022    K 4.4 11/02/2022    CO2 25.1 11/02/2022    CALCIUM 9.3 11/02/2022    ALBUMIN 3.90 11/02/2022    LABIL2 1.6 05/18/2021    AST 13 11/02/2022    ALT 12 11/02/2022       Lab Results   Component Value Date    CHOL 214 (H) 09/28/2022    CHLPL 205 (H) 05/18/2021    TRIG 227 (H) 09/28/2022    HDL 34 (L) 09/28/2022     (H) 09/28/2022            Assessment and Plan    Diagnoses and all orders for this visit:    1. Prostate cancer metastatic to bone (HCC) (Primary)  Assessment & Plan:  Tanvir found to have screening PSA of >86 three months ago.  At that time, we had referred him urgently to Urology but he declined to schedule an appointment at that time because his good friend was dying in the hospital.  Unfortunately, we have been unable to advance him to reschedule in the interim since she passed.  He was seen at the [facility] ED in November at which time he received a CT A/P with contrast which showed a lesion in the left prostate as well as multiple sclerotic lesions throughout the visualized skeleton compatible with metastatic disease.  He and I discussed today that his diagnosis is consistent with prostate disease metastatic to the bone.  We discussed today that if he is not interested in following up with Urology, it would be best for us to call in Hospice.  He states that at this time he is willing to go see a urologist to find out what his  treatment options are.  Urgent referral placed to [facility].    Orders:  -     Ambulatory Referral to Urology    2. Right hip pain  Assessment & Plan:  Concern for possible metastatic bony disease in the right hip.  Checking x-ray today.    Orders:  -     XR Hip With or Without Pelvis 2 - 3 View Right; Future    3. Acute deep vein thrombosis (DVT) of proximal vein of right lower extremity (HCC)  Assessment & Plan:  He has stopped his Xarelto at home after his last prescription ran out.  We are going to get him restarted on that.  His swelling has decreased significantly.  He is no longer wearing compression stockings.  His hypercoagulability is likely due to malignancy.    Orders:  -     rivaroxaban (Xarelto) 20 MG tablet; Take 1 tablet by mouth Daily.  Dispense: 30 tablet; Refill: 5    4. Gastroesophageal reflux disease without esophagitis  Assessment & Plan:  He was treated with omeprazole in the [facility] ED for nausea and vomiting and other reflux symptoms.  I am going to restart that prescription today as he ran out.  He did think it was useful, however.    Orders:  -     omeprazole (priLOSEC) 40 MG capsule; Take 1 capsule by mouth Daily.  Dispense: 30 capsule; Refill: 5    5. Type 2 diabetes mellitus with diabetic polyneuropathy, without long-term current use of insulin ([facility])  Assessment & Plan:  He has stopped taking his metformin 1000 mg twice daily.  States that he was instructed to do so after being seen in the ED, but I do not see that he had decreased kidney function.  It may be that he was told to stop for couple of days after his CT scan and misunderstood the instructions.  He is frustrated that he is unable to check his blood sugar twice daily, but we discussed that insurance will not pay for strips more than once daily unless he was on insulin.  Checking labs today to see where his A1c stands.  He has been off of the metformin now for about 5 to 6 weeks.    Orders:  -     Comprehensive  Metabolic Panel; Future  -     Hemoglobin A1c; Future    6. Primary hypertension  Assessment & Plan:  He has self discontinued his HCTZ and amlodipine both but blood pressure does seem to be holding pretty stable on lisinopril 20 mg twice daily.  Continue lisinopril at that dosing and continue to monitor.    Orders:  -     lisinopril (PRINIVIL,ZESTRIL) 20 MG tablet; Take 1 tablet by mouth 2 (Two) Times a Day.  Dispense: 180 tablet; Refill: 1    7. Mixed hyperlipidemia  Assessment & Plan:  No longer taking the rosuvastatin that was started recently.  We will not plan to restart at this time as it may be a moot point given his prostate cancer diagnosis.      8. Major depressive disorder, recurrent episode, moderate degree (HCC)  Assessment & Plan:  Not currently on medication.  He seems to be coping with the loss of his friend as well as could be expected.  Continue to monitor.        Follow Up   Return in about 3 months (around 4/3/2023) for Recheck.  Patient was given instructions and counseling regarding his condition or for health maintenance advice. Please see specific information pulled into the AVS if appropriate.

## 2023-01-03 NOTE — ASSESSMENT & PLAN NOTE
No longer taking the rosuvastatin that was started recently.  We will not plan to restart at this time as it may be a moot point given his prostate cancer diagnosis.

## 2023-01-03 NOTE — ASSESSMENT & PLAN NOTE
He has self discontinued his HCTZ and amlodipine both but blood pressure does seem to be holding pretty stable on lisinopril 20 mg twice daily.  Continue lisinopril at that dosing and continue to monitor.

## 2023-01-06 RX ORDER — PROMETHAZINE HYDROCHLORIDE 25 MG/1
25 SUPPOSITORY RECTAL EVERY 6 HOURS PRN
Qty: 60 SUPPOSITORY | Refills: 0 | Status: SHIPPED | OUTPATIENT
Start: 2023-01-06

## 2023-01-10 ENCOUNTER — TELEPHONE (OUTPATIENT)
Dept: UROLOGY | Facility: CLINIC | Age: 63
End: 2023-01-10

## 2023-01-10 NOTE — TELEPHONE ENCOUNTER
PT WAS CHECKED IN FOR HIS APPT. AT 11:35AM.  APPT TIME WAS 12:00  PROVIDER RUNNING BEHIND APROX 1 HOUR.  ANNOUNCEMENT WAS MADE TO PATIENTS.  PT LEFT WITH OUT SPEAKING WITH STAFF.  CALLED PATIENT AT 12:39, [facility] THAT  WAS READY TO SEE HIM  CALLED PATIENT AT 12:54- PT PICKED UP AND THEN HUNG UP.  1:36- PT APPT NO SHOWED

## 2023-01-10 NOTE — TELEPHONE ENCOUNTER
SPOKE TO TAO AT DR. CASTILLO'S OFFICE TO INFORM PT NO SHOWED NEW PT APPT WITH DR. HERNANDEZ FROM 1/10/MS

## 2023-01-19 ENCOUNTER — OFFICE VISIT (OUTPATIENT)
Dept: FAMILY MEDICINE CLINIC | Age: 63
End: 2023-01-19
Payer: MEDICAID

## 2023-01-19 ENCOUNTER — TELEPHONE (OUTPATIENT)
Dept: UROLOGY | Facility: CLINIC | Age: 63
End: 2023-01-19
Payer: MEDICAID

## 2023-01-19 VITALS
WEIGHT: 185 LBS | SYSTOLIC BLOOD PRESSURE: 174 MMHG | HEART RATE: 63 BPM | DIASTOLIC BLOOD PRESSURE: 74 MMHG | HEIGHT: 74 IN | BODY MASS INDEX: 23.74 KG/M2

## 2023-01-19 DIAGNOSIS — C61 PROSTATE CANCER METASTATIC TO BONE: Primary | ICD-10-CM

## 2023-01-19 DIAGNOSIS — I10 PRIMARY HYPERTENSION: ICD-10-CM

## 2023-01-19 DIAGNOSIS — C79.51 PROSTATE CANCER METASTATIC TO BONE: Primary | ICD-10-CM

## 2023-01-19 PROCEDURE — 3051F HG A1C>EQUAL 7.0%<8.0%: CPT | Performed by: FAMILY MEDICINE

## 2023-01-19 PROCEDURE — 99214 OFFICE O/P EST MOD 30 MIN: CPT | Performed by: FAMILY MEDICINE

## 2023-01-19 RX ORDER — AMLODIPINE BESYLATE 5 MG/1
5 TABLET ORAL DAILY
Qty: 30 TABLET | Refills: 5 | Status: SHIPPED | OUTPATIENT
Start: 2023-01-19

## 2023-01-19 RX ORDER — ONDANSETRON 8 MG/1
8 TABLET, ORALLY DISINTEGRATING ORAL EVERY 8 HOURS PRN
Qty: 45 TABLET | Refills: 0 | Status: SHIPPED | OUTPATIENT
Start: 2023-01-19

## 2023-01-19 NOTE — PROGRESS NOTES
"Chief Complaint  Anxiety and Abdominal Pain     34 minutes were spent in this encounter, including the face-to-face encounter, chart review, documentation, and coordination of care.    Subjective          Cleve Ibarra presents to [facility] today for a few issues.    He is complaining primarily of gas and bloating.  He gets extremely nauseated with crampy abdominal pain.  It become so extreme, sometimes he has to just lie down on the floor and wait for it to pass.  If he can get himself to belch, the pain is immediately relieved and he feels 100% better.  He has been using Beano and that provides temporary relief.  He does think that the omeprazole helps since he started that but \"it doesn't solve the problem.\"  He has been trying baking soda as well but worries that this is increased his blood pressure.      Current Outpatient Medications:   •  FREESTYLE LITE test strip, Test once daily, Disp: 100 each, Rfl: 5  •  lisinopril (PRINIVIL,ZESTRIL) 20 MG tablet, Take 1 tablet by mouth 2 (Two) Times a Day., Disp: 180 tablet, Rfl: 1  •  omeprazole (priLOSEC) 40 MG capsule, Take 1 capsule by mouth Daily., Disp: 30 capsule, Rfl: 5  •  promethazine (PHENERGAN) 25 MG suppository, Insert 1 suppository into the rectum Every 6 (Six) Hours As Needed for Nausea or Vomiting., Disp: 60 suppository, Rfl: 0  •  rivaroxaban (Xarelto) 20 MG tablet, Take 1 tablet by mouth Daily., Disp: 30 tablet, Rfl: 5  •  amLODIPine (NORVASC) 5 MG tablet, Take 1 tablet by mouth Daily., Disp: 30 tablet, Rfl: 5  •  ondansetron ODT (ZOFRAN-ODT) 8 MG disintegrating tablet, Dissolve 1 tablet on the tongue Every 8 (Eight) Hours As Needed for Nausea or Vomiting., Disp: 45 tablet, Rfl: 0    Allergies:  Clonidine hcl, Fluoxetine, Sertraline, and Venlafaxine      Objective   Vital Signs:   Vitals:    01/19/23 1035 01/19/23 1119   BP: (!) 192/78 174/74   BP Location: Left arm Left arm   Patient Position: Sitting Sitting " "  Pulse: 65 63   Weight: 83.9 kg (185 lb)    Height: 188 cm (74.02\")        Physical Exam  Constitutional:       Appearance: Normal appearance.   HENT:      Head: Normocephalic and atraumatic.   Eyes:      Extraocular Movements: Extraocular movements intact.      Conjunctiva/sclera: Conjunctivae normal.   Pulmonary:      Effort: Pulmonary effort is normal. No respiratory distress.   Musculoskeletal:         General: Normal range of motion.   Skin:     General: Skin is warm and dry.   Neurological:      General: No focal deficit present.      Mental Status: He is alert and oriented to person, place, and time.   Psychiatric:         Mood and Affect: Mood normal.         Behavior: Behavior normal.         Thought Content: Thought content normal.         Judgment: Judgment normal.          Lab Results   Component Value Date    GLUCOSE 181 (H) 01/03/2023    BUN 31 (H) 01/03/2023    CREATININE 1.61 (H) 01/03/2023    BCR 19.3 01/03/2023    K 4.2 01/03/2023    CO2 31.0 (H) 01/03/2023    CALCIUM 10.2 01/03/2023    ALBUMIN 4.6 01/03/2023    LABIL2 1.6 05/18/2021    AST 13 01/03/2023    ALT 8 01/03/2023       Lab Results   Component Value Date    CHOL 214 (H) 09/28/2022    CHLPL 205 (H) 05/18/2021    TRIG 227 (H) 09/28/2022    HDL 34 (L) 09/28/2022     (H) 09/28/2022            Assessment and Plan    Diagnoses and all orders for this visit:    1. Prostate cancer metastatic to bone (HCC) (Primary)  Assessment & Plan:  Extensive discussion today with Cleve about his diagnosis of metastatic prostate cancer.  He denies any pain complaints.  His main issue at the present time is severe nausea and vomiting which incapacitates him at times.  He has Phenergan at home but this has been minimally effective.  He is using omeprazole and Beano as well for gas and bloating.  He does not have any Zofran.  I have sent in Zofran for him today.  He reports today that he had an episode of severe nausea while waiting for his appointment " "at the urologist's office last week and because they were running about an hour behind, he went ahead and left.  We discussed that it may already be too late for the urologist to offer him much in the way of curative treatment, but if he wants to know what his options are, every day that we hesitate diminishes the possibility of there being any meaningful treatment that can be offered.  He states that he does understand that he has prostate cancer which has metastasized and that it is a terminal diagnosis ultimately.  \"Everyone is going to die sometime.\"  He states he is willing to see the urologist if we can get his nausea and stomach symptoms under better control, but \"when the nausea hits me, I cannot worry about anything else.\"  We also talked about his anxiety.  I am going to start him on some clonazepam 0.5 mg daily as needed.  Ky was run today and contract was signed.  We do need to get a tox screen on him but he did not feel he would be able to leave a sample today.  He will come back first thing tomorrow morning to leave that sample, after which I will send in his prescription.  I did tell him that I was very worried about him, both about the possibility of him suffering and that if we do not act, that he may have regrets later on when it is too late about how he chose to proceed in regards to seeking treatment.  He acknowledged these concerns, but states that he has no regrets and is not currently suffering much at all outside of the nausea.  He is not interested in pursuing hospice at this time.  He states that his wife was on hospice and \"when you go on Hospice, it just means you've given up.\"  I will go ahead and place that referral to Dr. [facility] in [facility].    Orders:  -     ondansetron ODT (ZOFRAN-ODT) 8 MG disintegrating tablet; Dissolve 1 tablet on the tongue Every 8 (Eight) Hours As Needed for Nausea or Vomiting.  Dispense: 45 tablet; Refill: 0  -     Ambulatory Referral to Urology    2. " Primary hypertension  Assessment & Plan:  Blood pressure is running significantly above goal.  He is maxed out on lisinopril, 20 mg twice daily.  Adding amlodipine 5 mg daily.  Continue to monitor at home.    Orders:  -     amLODIPine (NORVASC) 5 MG tablet; Take 1 tablet by mouth Daily.  Dispense: 30 tablet; Refill: 5      Follow Up   No follow-ups on file.  Patient was given instructions and counseling regarding his condition or for health maintenance advice. Please see specific information pulled into the AVS if appropriate.           01/19/2023

## 2023-01-19 NOTE — ASSESSMENT & PLAN NOTE
Blood pressure is running significantly above goal.  He is maxed out on lisinopril, 20 mg twice daily.  Adding amlodipine 5 mg daily.  Continue to monitor at home.

## 2023-01-19 NOTE — ASSESSMENT & PLAN NOTE
"Extensive discussion today with Cleve about his diagnosis of metastatic prostate cancer.  He denies any pain complaints.  His main issue at the present time is severe nausea and vomiting which incapacitates him at times.  He has Phenergan at home but this has been minimally effective.  He is using omeprazole and Beano as well for gas and bloating.  He does not have any Zofran.  I have sent in Zofran for him today.  He reports today that he had an episode of severe nausea while waiting for his appointment at the urologist's office last week and because they were running about an hour behind, he went ahead and left.  We discussed that it may already be too late for the urologist to offer him much in the way of curative treatment, but if he wants to know what his options are, every day that we hesitate diminishes the possibility of there being any meaningful treatment that can be offered.  He states that he does understand that he has prostate cancer which has metastasized and that it is a terminal diagnosis ultimately.  \"Everyone is going to die sometime.\"  He states he is willing to see the urologist if we can get his nausea and stomach symptoms under better control, but \"when the nausea hits me, I cannot worry about anything else.\"  We also talked about his anxiety.  I am going to start him on some clonazepam 0.5 mg daily as needed.  Ky was run today and contract was signed.  We do need to get a tox screen on him but he did not feel he would be able to leave a sample today.  He will come back first thing tomorrow morning to leave that sample, after which I will send in his prescription.  I did tell him that I was very worried about him, both about the possibility of him suffering and that if we do not act, that he may have regrets later on when it is too late about how he chose to proceed in regards to seeking treatment.  He acknowledged these concerns, but states that he has no regrets and is not currently " "suffering much at all outside of the nausea.  He is not interested in pursuing hospice at this time.  He states that his wife was on hospice and \"when you go on Hospice, it just means you've given up.\"  I will go ahead and place that referral to Dr. [facility] in [facility].  "

## 2023-01-20 ENCOUNTER — CLINICAL SUPPORT (OUTPATIENT)
Dept: FAMILY MEDICINE CLINIC | Age: 63
End: 2023-01-20
Payer: MEDICAID

## 2023-01-20 DIAGNOSIS — Z79.899 HIGH RISK MEDICATION USE: Primary | ICD-10-CM

## 2023-01-20 DIAGNOSIS — F41.9 ANXIETY: ICD-10-CM

## 2023-01-20 LAB
AMPHET+METHAMPHET UR QL: NEGATIVE
AMPHETAMINES UR QL: NEGATIVE
BARBITURATES UR QL SCN: NEGATIVE
BENZODIAZ UR QL SCN: NEGATIVE
BUPRENORPHINE SERPL-MCNC: NEGATIVE NG/ML
CANNABINOIDS SERPL QL: NEGATIVE
COCAINE UR QL: NEGATIVE
EXPIRATION DATE: NORMAL
Lab: NORMAL
MDMA UR QL SCN: NEGATIVE
METHADONE UR QL SCN: NEGATIVE
OPIATES UR QL: NEGATIVE
OXYCODONE UR QL SCN: NEGATIVE
PCP UR QL SCN: NEGATIVE

## 2023-01-20 PROCEDURE — 80305 DRUG TEST PRSMV DIR OPT OBS: CPT | Performed by: FAMILY MEDICINE

## 2023-01-20 RX ORDER — CLONAZEPAM 0.5 MG/1
0.5 TABLET ORAL DAILY PRN
Qty: 30 TABLET | Refills: 1 | Status: SHIPPED | OUTPATIENT
Start: 2023-01-20

## 2023-01-26 ENCOUNTER — OFFICE VISIT (OUTPATIENT)
Dept: UROLOGY | Facility: CLINIC | Age: 63
End: 2023-01-26
Payer: MEDICAID

## 2023-01-26 VITALS
DIASTOLIC BLOOD PRESSURE: 65 MMHG | HEART RATE: 79 BPM | WEIGHT: 193 LBS | TEMPERATURE: 97.7 F | BODY MASS INDEX: 24.77 KG/M2 | HEIGHT: 74 IN | SYSTOLIC BLOOD PRESSURE: 190 MMHG

## 2023-01-26 DIAGNOSIS — R97.20 ELEVATED PROSTATE SPECIFIC ANTIGEN (PSA): Primary | ICD-10-CM

## 2023-01-26 DIAGNOSIS — C79.51 PROSTATE CANCER METASTATIC TO BONE: ICD-10-CM

## 2023-01-26 DIAGNOSIS — C79.51 BONY METASTASIS: ICD-10-CM

## 2023-01-26 DIAGNOSIS — N50.89 SCROTAL EDEMA: ICD-10-CM

## 2023-01-26 DIAGNOSIS — C61 PROSTATE CANCER METASTATIC TO BONE: ICD-10-CM

## 2023-01-26 LAB
BILIRUB BLD-MCNC: NEGATIVE MG/DL
CLARITY, POC: CLEAR
COLOR UR: YELLOW
GLUCOSE UR STRIP-MCNC: ABNORMAL MG/DL
KETONES UR QL: NEGATIVE
LEUKOCYTE EST, POC: NEGATIVE
NITRITE UR-MCNC: NEGATIVE MG/ML
PH UR: 6.5 [PH] (ref 5–8)
PROT UR STRIP-MCNC: ABNORMAL MG/DL
RBC # UR STRIP: ABNORMAL /UL
SP GR UR: 1.03 (ref 1–1.03)
UROBILINOGEN UR QL: ABNORMAL

## 2023-01-26 PROCEDURE — 99204 OFFICE O/P NEW MOD 45 MIN: CPT | Performed by: UROLOGY

## 2023-01-26 PROCEDURE — 87081 CULTURE SCREEN ONLY: CPT | Performed by: UROLOGY

## 2023-01-26 RX ORDER — ASPIRIN 81 MG/1
81 TABLET ORAL DAILY
COMMUNITY
End: 2023-02-17

## 2023-01-26 RX ORDER — HYDROCHLOROTHIAZIDE 25 MG/1
25 TABLET ORAL DAILY
COMMUNITY
End: 2023-02-17

## 2023-01-26 NOTE — PROGRESS NOTES
"Chief Complaint  Elevated PSA    Thrombophlebitis of the right lower extremity    Subjective  No acute distress        Cleve Ibarra presents to [facility]  History of Present Illness    62-year-old white male was referred because of bony metastasis in the right hip and lumbar area and markedly elevated PSA of 86.700.  Patient PSA on 9/12/2019 was 4.2 and on 5/27/2020 was 6.9.  Last PSA 09/28/2022 is 86.700.  Patient has been having some difficulty with urination for almost 7 years now.  He does not empty his bladder 80% of the time and has nocturia 3 times.  He has a lot of frequency and intermittency.  Stream is weak 50% of the time and overall AUA score is 16/35.    Patient has been having a lot of edema of the scrotum and suprapubic area and has thrombophlebitis of the right lower extremity.  Patient is on Xarelto and aspirin.    Patient has been having some nausea but is not losing any weight and is eating very well.    Patient is diabetic.    No dysuria or gross hematuria.  No family history of prostate cancer as far as he knows.  Patient is not sexually active at this time.    Patient has no pain in the hip joints or lumbar area    Objective No acute distress  Vital Signs:   BP (!) 190/65   Pulse 79   Temp 97.7 °F (36.5 °C)   Ht 188 cm (74.02\")   Wt 87.5 kg (193 lb)   BMI 24.77 kg/m²     Allergies   Allergen Reactions   • Clonidine Hcl Unknown - Low Severity   • Fluoxetine Unknown - Low Severity   • Sertraline Unknown - Low Severity   • Venlafaxine Unknown - Low Severity      Past medical history:  has a past medical history of Anxiety disorder, Atherosclerosis of native arteries of extremities with intermittent claudication, unspecified extremity (HCC), Cardiac arrhythmia, Elevated prostate specific antigen (PSA), Essential (primary) hypertension, Heartburn, Hyperlipidemia, Major depressive disorder, Male erectile dysfunction, Other long term (current) drug therapy, Pain in " left foot, and Type 2 diabetes mellitus with hyperglycemia (HCC).   Past surgical history:  has a past surgical history that includes Tibia fracture surgery (Left, 1991).  Personal history: family history includes Alcohol abuse in his father; Alzheimer's disease in his mother; Diabetes in his father.  Social history:  reports that he has been smoking cigarettes. He has a 60.00 pack-year smoking history. He has never used smokeless tobacco. He reports that he does not currently use alcohol. He reports that he does not use drugs.    Review of Systems    No change from before.    Physical Exam  Constitutional:       General: He is not in acute distress.     Appearance: Normal appearance. He is normal weight. He is not ill-appearing or toxic-appearing.   HENT:      Head: Normocephalic and atraumatic.      Ears:      Comments: No hearing loss  Cardiovascular:      Rate and Rhythm: Normal rate and regular rhythm.      Pulses: Normal pulses.      Heart sounds: Normal heart sounds. No murmur heard.  Pulmonary:      Effort: Pulmonary effort is normal.      Breath sounds: Normal breath sounds. No rhonchi or rales.   Abdominal:      Palpations: Abdomen is soft. There is mass.      Tenderness: There is no abdominal tenderness. There is no right CVA tenderness or left CVA tenderness.      Hernia: No hernia is present.      Comments: Patient has edema of the suprapubic area but I do not feel any lymph nodes.  No abdominal masses.   Genitourinary:     Comments: Penis is normal.    Marked edema of scrotum and examination is a bit painful to the patient.    Right and left testes and epididymis is normal.    ESTUARDO.  Prostate gland is like about 30 g.  I feel nodularity of the right base of the prostate.    Examination is pretty painful to the patient  Musculoskeletal:         General: No swelling. Normal range of motion.      Cervical back: Normal range of motion and neck supple. No rigidity or tenderness.      Right lower leg: Edema  present.      Left lower leg: No edema.   Lymphadenopathy:      Cervical: No cervical adenopathy.   Skin:     General: Skin is warm.      Coloration: Skin is not jaundiced.   Neurological:      General: No focal deficit present.      Mental Status: He is alert and oriented to person, place, and time. Mental status is at baseline.      Gait: Gait normal.   Psychiatric:         Mood and Affect: Mood normal.         Behavior: Behavior normal.         Thought Content: Thought content normal.         Judgment: Judgment normal.        Result Review :                 Assessment and Plan    Diagnoses and all orders for this visit:    1. Elevated prostate specific antigen (PSA) (Primary)  -     POC Urinalysis Dipstick    2. Scrotal edema    3. Bony metastasis (HCC)    Patient has elevated PSA and clinical diagnosis he has metastatic prostate carcinoma but no tissue diagnosis is available.  On rectal examination patient has nodularity of the right base of the prostate but on the CT scan he has a mass in the left lobe of prostate.  CT scan of abdomen pelvis reveals sclerotic lesion in the left hip and pelvis and lumbar area confirmed by ordinary x-rays of hip.    Rectal examination was pretty painful to the patient so I have done anal swab for him so he can be under appropriate antibiotic after the biopsy but I do not think that he can handle biopsy in the office and I have to refer him to surgical specialist urologists.  Patient is on Xarelto which needs to be stopped 2 days before the biopsy and baby aspirin which needs to be stopped 1 week before the biopsy and I discussed that with the patient.    I will let the surgical specialist urologist decide further about the patient.     Brief Urine Lab Results  (Last result in the past 365 days)      Color   Clarity   Blood   Leuk Est   Nitrite   Protein   CREAT   Urine HCG        01/26/23 0929 Yellow   Clear   Trace   Negative   Negative   100 mg/dL                  Follow Up    No follow-ups on file.  Patient was given instructions and counseling regarding his condition or for health maintenance advice. Please see specific information pulled into the AVS if appropriate.     Kaylin Lauren MD

## 2023-01-27 ENCOUNTER — LAB (OUTPATIENT)
Dept: LAB | Facility: HOSPITAL | Age: 63
End: 2023-01-27
Payer: MEDICAID

## 2023-01-27 ENCOUNTER — PREP FOR SURGERY (OUTPATIENT)
Dept: OTHER | Facility: HOSPITAL | Age: 63
End: 2023-01-27
Payer: MEDICAID

## 2023-01-27 ENCOUNTER — OFFICE VISIT (OUTPATIENT)
Dept: UROLOGY | Facility: CLINIC | Age: 63
End: 2023-01-27
Payer: MEDICAID

## 2023-01-27 VITALS — BODY MASS INDEX: 24.23 KG/M2 | HEIGHT: 74 IN | WEIGHT: 188.8 LBS | RESPIRATION RATE: 14 BRPM

## 2023-01-27 DIAGNOSIS — N50.89 SCROTAL EDEMA: ICD-10-CM

## 2023-01-27 DIAGNOSIS — R97.20 ELEVATED PROSTATE SPECIFIC ANTIGEN (PSA): ICD-10-CM

## 2023-01-27 DIAGNOSIS — R97.20 ELEVATED PROSTATE SPECIFIC ANTIGEN (PSA): Primary | ICD-10-CM

## 2023-01-27 PROCEDURE — 51798 US URINE CAPACITY MEASURE: CPT | Performed by: UROLOGY

## 2023-01-27 PROCEDURE — 99214 OFFICE O/P EST MOD 30 MIN: CPT | Performed by: UROLOGY

## 2023-01-27 RX ORDER — CIPROFLOXACIN 500 MG/1
500 TABLET, FILM COATED ORAL 2 TIMES DAILY
Qty: 6 TABLET | Refills: 0 | Status: SHIPPED | OUTPATIENT
Start: 2023-01-27 | End: 2023-01-30

## 2023-01-27 RX ORDER — SODIUM CHLORIDE 9 MG/ML
100 INJECTION, SOLUTION INTRAVENOUS CONTINUOUS
Status: CANCELLED | OUTPATIENT
Start: 2023-01-27

## 2023-01-27 RX ORDER — BICALUTAMIDE 50 MG/1
50 TABLET, FILM COATED ORAL DAILY
Qty: 60 TABLET | Refills: 0 | Status: SHIPPED | OUTPATIENT
Start: 2023-01-27 | End: 2023-03-31

## 2023-01-27 RX ORDER — CEFTRIAXONE SODIUM 1 G/50ML
1 INJECTION, SOLUTION INTRAVENOUS
Status: CANCELLED | OUTPATIENT
Start: 2023-02-08 | End: 2023-02-09
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
## 2023-01-30 ENCOUNTER — TELEPHONE (OUTPATIENT)
Dept: UROLOGY | Facility: CLINIC | Age: 63
End: 2023-01-30
Payer: MEDICAID

## 2023-01-30 ENCOUNTER — TELEPHONE (OUTPATIENT)
Dept: FAMILY MEDICINE CLINIC | Age: 63
End: 2023-01-30
Payer: MEDICAID

## 2023-01-30 DIAGNOSIS — R97.20 ELEVATED PROSTATE SPECIFIC ANTIGEN (PSA): ICD-10-CM

## 2023-01-30 DIAGNOSIS — C61 PROSTATE CANCER METASTATIC TO BONE: Primary | ICD-10-CM

## 2023-01-30 DIAGNOSIS — C79.51 PROSTATE CANCER METASTATIC TO BONE: Primary | ICD-10-CM

## 2023-01-30 NOTE — TELEPHONE ENCOUNTER
Tried to call pt to see if he is going through with surgery or not. No answer and no voicemail box     ----- Message from Gen Danielle MD sent at 1/27/2023  4:22 PM EST -----  Regarding: pt    We need to call him next week and to see if he wants to stay on the schedule or be canceled.    We do need to remind him that he does need treatment and I am glad to take care of him.     If he decides not to go further to make sure we document that we have told him multiple times that he would likely die of prostate cancer quicker if we do not get him some treatment

## 2023-01-31 LAB
BACTERIA ANAL CULT: NORMAL
BACTERIA ANAL CULT: NORMAL

## 2023-01-31 NOTE — LETTER
[facility] [place]  [facility]  [address]  [place] [address]  Fax [phone]  Phone [phone]     To whom it may concern:    I am writing on behalf of our mutual patient Cleve Ibarra 1960     Cleve Ibarra  is scheduled to have a transrectal ultrasound guided prostate biopsy under sedation by Gen Danielle MD which will be performed at [facility] on February 8, 2023. Patient will need to hold xarelto and aspirin for 3 days prior. Please respond to this request noting your recommendations regarding clearance from the general medicine standpoint. You may contact our office at [phone] with any questions. I appreciate your prompt response to this matter. Please return this form to our office as soon as possible to [phone]. Cleve Ibarra is scheduled for this procedure pending your approval. Thank you for your time and assistance.     [] I approve my patient, Cleve Ibarra , to proceed with the surgical procedure listed above.   [] I do NOT approve my patient, Cleve Ibarra , to proceed with the surgical procedure listed above.   [] I approve my patient, Cleve Ibarra , from a general medicine standpoint.   [] I do NOT approve my patient, Cleve Ibarra , from a general medicine standpoint at this time.       Approving physician name(please print): ________________________________________    Approving physician signature: _____________________________________________ Date: ____________________________    Sincerely,     Gen Danielle MD

## 2023-01-31 NOTE — PROGRESS NOTES
I do approve to hold Xarelto and aspirin for 3 days prior to procedure and to undergo surgery from a general medical standpoint.  Thanks, BZERINN

## 2023-02-17 ENCOUNTER — OFFICE VISIT (OUTPATIENT)
Dept: FAMILY MEDICINE CLINIC | Age: 63
End: 2023-02-17
Payer: MEDICAID

## 2023-02-17 VITALS
BODY MASS INDEX: 24.9 KG/M2 | SYSTOLIC BLOOD PRESSURE: 163 MMHG | HEART RATE: 65 BPM | HEIGHT: 74 IN | DIASTOLIC BLOOD PRESSURE: 64 MMHG | WEIGHT: 194 LBS

## 2023-02-17 DIAGNOSIS — I10 PRIMARY HYPERTENSION: ICD-10-CM

## 2023-02-17 DIAGNOSIS — C79.51 PROSTATE CANCER METASTATIC TO BONE: Primary | ICD-10-CM

## 2023-02-17 DIAGNOSIS — R60.0 PEDAL EDEMA: ICD-10-CM

## 2023-02-17 DIAGNOSIS — C61 PROSTATE CANCER METASTATIC TO BONE: Primary | ICD-10-CM

## 2023-02-17 PROCEDURE — 99214 OFFICE O/P EST MOD 30 MIN: CPT | Performed by: FAMILY MEDICINE

## 2023-02-17 PROCEDURE — 3051F HG A1C>EQUAL 7.0%<8.0%: CPT | Performed by: FAMILY MEDICINE

## 2023-02-17 RX ORDER — CARVEDILOL 3.12 MG/1
3.12 TABLET ORAL 2 TIMES DAILY
Qty: 60 TABLET | Refills: 5 | Status: SHIPPED | OUTPATIENT
Start: 2023-02-17

## 2023-02-17 NOTE — ASSESSMENT & PLAN NOTE
He is interested in adding another blood pressure medication, although he is very wary of adding new meds in general.  We will try some carvedilol 3.125 mg twice daily as he had some acute kidney injury on his last labs and I am loath to add in a diuretic until we know more about how the kidneys are functioning.  He did not have any obvious renal lesions on the CT A/P done back in November at [facility].

## 2023-02-17 NOTE — PROGRESS NOTES
"Chief Complaint  Prostate Cancer     Subjective      30 minutes were spent in this encounter, including the face-to-face encounter, chart review, documentation, and coordination of care.        Cleve Ibarra presents to [facility] today to discuss his metastatic prostate cancer.    We have had multiple discussions that Cleve has prostate cancer with his last PSA elevated 87 and lesions consistent with diffuse metastasis to bone throughout the body seen incidentally on CT AP done at [facility].  He has complained of right hip pain as well and x-ray demonstrated sclerotic lesions suspected to be metastatic disease.  He has been set up with Dr. [facility] but did not stay to be seen by her at that appointment.  He was then set up follow-up with Dr. [facility].  Dr. Lauren referred him to Dr. Danielle for biopsy and further treatment.  He was scheduled to go for biopsy with Dr. Danielle on 2/8/2023 but did not go to that appointment.  He has been very frustrated and feels that \"they don't need to do that biopsy anyway.  They already know I have cancer.\"    He was given a 30 day supply of Casodex by Dr. Danielle pre-emptively and has been using that 2 weeks now.  No reported adverse effects.    Cleve has a new establish care visit with [facility] on 2/23/23.    His blood pressure has been running high.  Already on amlodipine and lisinopril.  Taking amlodipine 5 mg daily but increasing that dose has given him stomach upset in the past.      Current Outpatient Medications:   •  amLODIPine (NORVASC) 5 MG tablet, Take 1 tablet by mouth Daily., Disp: 30 tablet, Rfl: 5  •  bicalutamide (CASODEX) 50 MG chemo tablet, Take 1 tablet by mouth Daily for 60 days., Disp: 60 tablet, Rfl: 0  •  clonazePAM (KlonoPIN) 0.5 MG tablet, Take 1 tablet by mouth Daily As Needed for Anxiety., Disp: 30 tablet, Rfl: 1  •  FREESTYLE LITE test strip, Test once daily, Disp: 100 each, Rfl: 5  •  lisinopril " "(PRINIVIL,ZESTRIL) 20 MG tablet, Take 1 tablet by mouth 2 (Two) Times a Day. (Patient taking differently: Take 40 mg by mouth 2 (Two) Times a Day.), Disp: 180 tablet, Rfl: 1  •  metFORMIN (GLUCOPHAGE) 1000 MG tablet, Take 1,000 mg by mouth 2 (Two) Times a Day With Meals., Disp: , Rfl:   •  omeprazole (priLOSEC) 40 MG capsule, Take 1 capsule by mouth Daily., Disp: 30 capsule, Rfl: 5  •  ondansetron ODT (ZOFRAN-ODT) 8 MG disintegrating tablet, Dissolve 1 tablet on the tongue Every 8 (Eight) Hours As Needed for Nausea or Vomiting., Disp: 45 tablet, Rfl: 0  •  promethazine (PHENERGAN) 25 MG suppository, Insert 1 suppository into the rectum Every 6 (Six) Hours As Needed for Nausea or Vomiting., Disp: 60 suppository, Rfl: 0  •  rivaroxaban (Xarelto) 20 MG tablet, Take 1 tablet by mouth Daily., Disp: 30 tablet, Rfl: 5  •  carvedilol (COREG) 3.125 MG tablet, Take 1 tablet by mouth 2 Times a Day., Disp: 60 tablet, Rfl: 5    Allergies:  Clonidine hcl, Fluoxetine, Sertraline, and Venlafaxine      Objective   Vital Signs:   Vitals:    02/17/23 0951 02/17/23 1024   BP: 162/75 163/64   BP Location: Left arm Left arm   Patient Position: Sitting Sitting   Cuff Size: Adult Adult   Pulse: 69 65   Weight: 88 kg (194 lb)    Height: 188 cm (74.02\")        Physical Exam  Constitutional:       Appearance: Normal appearance.   HENT:      Head: Normocephalic and atraumatic.   Eyes:      Extraocular Movements: Extraocular movements intact.      Conjunctiva/sclera: Conjunctivae normal.   Pulmonary:      Effort: Pulmonary effort is normal. No respiratory distress.   Musculoskeletal:         General: Normal range of motion.   Skin:     General: Skin is warm and dry.   Neurological:      General: No focal deficit present.      Mental Status: He is alert and oriented to person, place, and time.   Psychiatric:         Mood and Affect: Mood normal.         Behavior: Behavior normal.         Thought Content: Thought content normal.         Judgment: " "Judgment normal.          Lab Results   Component Value Date    GLUCOSE 181 (H) 01/03/2023    BUN 31 (H) 01/03/2023    CREATININE 1.61 (H) 01/03/2023    BCR 19.3 01/03/2023    K 4.2 01/03/2023    CO2 31.0 (H) 01/03/2023    CALCIUM 10.2 01/03/2023    ALBUMIN 4.6 01/03/2023    LABIL2 1.6 05/18/2021    AST 13 01/03/2023    ALT 8 01/03/2023       Lab Results   Component Value Date    CHOL 214 (H) 09/28/2022    CHLPL 205 (H) 05/18/2021    TRIG 227 (H) 09/28/2022    HDL 34 (L) 09/28/2022     (H) 09/28/2022            Assessment and Plan    Diagnoses and all orders for this visit:    1. Prostate cancer metastatic to bone (HCC) (Primary)  Assessment & Plan:  Cleve has been resistant to seeking treatment for his metastatic prostate cancer, although he does report that he understands that it is metastatic and terminal, especially without treatment.  He refused an appointment with Dr. [facility] as previously documented.  He did get along well with Dr. Lauren but when Dr. Lauren referred him to Dr. Danielle for biopsy and further treatment, Cleve again became frustrated.  He feels that the biopsy is unnecessary and also declined to take the preoperative Cipro course as he states that he read online that it is a very dangerous drug that has been recalled in [place].  I did discuss with him that this is an inaccurate claim, but it is pretty clear that he does not believe me about this and would continue to refuse its use in the future.  He was given a 30-day supply of Casodex to start and is tolerating that well by his account.  We discussed that he will find it difficult to find a urologist or oncologist either one who will treat him without a biopsy confirmed diagnosis.  However, he states, \"I just need someone to give me that cancer pill.\"  He has an appointment with [facility] on 2/23/2023 to establish with them.  I did discuss with him whether he would like to try his luck with an oncologist as well.  We " "discussed Dr. Abraham Mahmood by name, but he is not interested in this either.      He is complaining of increasing pedal edema today.  I recommended elevating the legs and use of compression stockings.  We are checking his kidney function again today with a BMP to see if he might be able to tolerate some Lasix, but his last creatinine was 1.61, so I do not feel comfortable starting that at this time.  He also had some concerns about his recent blood pressure increases that he could not account for.  We are treating that with the addition of carvedilol, but he pressed for an explanation of why it is going on.  I did discuss with him that he currently has cancer throughout his entire body and so changes such as increasing pedal edema and blood pressure swings are likely to only get worse without treatment of the underlying disease process.  Finally, he is interested in checking \"the thickness of my blood.  I am worried about these blood clots.\"  He is on Xarelto currently for malignancy-induced DVT.  I again reiterated that since the blood clots are coming from the cancer, and he is not undergoing treatment for the cancer, this will continue to be a problem.  We did also discuss that the Casodex is not curing his cancer, even if he does find someone else who will prescribe this for him, and he will require biopsy in order to have access to a full range of treatment options    Orders:  -     CBC & Differential; Future  -     Protime-INR; Future    2. Pedal edema  -     Basic Metabolic Panel; Future    3. Primary hypertension  Assessment & Plan:  He is interested in adding another blood pressure medication, although he is very wary of adding new meds in general.  We will try some carvedilol 3.125 mg twice daily as he had some acute kidney injury on his last labs and I am loath to add in a diuretic until we know more about how the kidneys are functioning.  He did not have any obvious renal lesions on the CT A/P done back " in November at [facility].    Orders:  -     carvedilol (COREG) 3.125 MG tablet; Take 1 tablet by mouth 2 Times a Day.  Dispense: 60 tablet; Refill: 5      Follow Up   Return in about 2 months (around 4/17/2023) for Recheck.  Patient was given instructions and counseling regarding his condition or for health maintenance advice. Please see specific information pulled into the AVS if appropriate.           01/19/2023

## 2023-02-17 NOTE — ASSESSMENT & PLAN NOTE
"Cleve has been resistant to seeking treatment for his metastatic prostate cancer, although he does report that he understands that it is metastatic and terminal, especially without treatment.  He refused an appointment with Dr. [facility] as previously documented.  He did get along well with Dr. Lauren but when Dr. Lauren referred him to Dr. Danielle for biopsy and further treatment, Cleve again became frustrated.  He feels that the biopsy is unnecessary and also declined to take the preoperative Cipro course as he states that he read online that it is a very dangerous drug that has been recalled in [place].  I did discuss with him that this is an inaccurate claim, but it is pretty clear that he does not believe me about this and would continue to refuse its use in the future.  He was given a 30-day supply of Casodex to start and is tolerating that well by his account.  We discussed that he will find it difficult to find a urologist or oncologist either one who will treat him without a biopsy confirmed diagnosis.  However, he states, \"I just need someone to give me that cancer pill.\"  He has an appointment with [facility] on 2/23/2023 to establish with them.  I did discuss with him whether he would like to try his luck with an oncologist as well.  We discussed Dr. Abraham Mahmood by name, but he is not interested in this either.      He is complaining of increasing pedal edema today.  I recommended elevating the legs and use of compression stockings.  We are checking his kidney function again today with a BMP to see if he might be able to tolerate some Lasix, but his last creatinine was 1.61, so I do not feel comfortable starting that at this time.  He also had some concerns about his recent blood pressure increases that he could not account for.  We are treating that with the addition of carvedilol, but he pressed for an explanation of why it is going on.  I did discuss with him that he currently has cancer " "throughout his entire body and so changes such as increasing pedal edema and blood pressure swings are likely to only get worse without treatment of the underlying disease process.  Finally, he is interested in checking \"the thickness of my blood.  I am worried about these blood clots.\"  He is on Xarelto currently for malignancy-induced DVT.  I again reiterated that since the blood clots are coming from the cancer, and he is not undergoing treatment for the cancer, this will continue to be a problem.  We did also discuss that the Casodex is not curing his cancer, even if he does find someone else who will prescribe this for him, and he will require biopsy in order to have access to a full range of treatment options  "

## 2023-02-20 ENCOUNTER — LAB (OUTPATIENT)
Dept: LAB | Facility: HOSPITAL | Age: 63
End: 2023-02-20
Payer: MEDICAID

## 2023-02-20 DIAGNOSIS — C79.51 PROSTATE CANCER METASTATIC TO BONE: ICD-10-CM

## 2023-02-20 DIAGNOSIS — C61 PROSTATE CANCER METASTATIC TO BONE: ICD-10-CM

## 2023-02-20 DIAGNOSIS — R97.20 ELEVATED PROSTATE SPECIFIC ANTIGEN (PSA): ICD-10-CM

## 2023-02-20 DIAGNOSIS — R60.0 PEDAL EDEMA: ICD-10-CM

## 2023-02-20 LAB
ANION GAP SERPL CALCULATED.3IONS-SCNC: 8 MMOL/L (ref 5–15)
BASOPHILS # BLD AUTO: 0.05 10*3/MM3 (ref 0–0.2)
BASOPHILS NFR BLD AUTO: 0.5 % (ref 0–1.5)
BUN SERPL-MCNC: 23 MG/DL (ref 8–23)
BUN/CREAT SERPL: 15.3 (ref 7–25)
CALCIUM SPEC-SCNC: 9.7 MG/DL (ref 8.6–10.5)
CHLORIDE SERPL-SCNC: 108 MMOL/L (ref 98–107)
CO2 SERPL-SCNC: 24 MMOL/L (ref 22–29)
CREAT SERPL-MCNC: 1.5 MG/DL (ref 0.76–1.27)
DEPRECATED RDW RBC AUTO: 45.9 FL (ref 37–54)
EGFRCR SERPLBLD CKD-EPI 2021: 52 ML/MIN/1.73
EOSINOPHIL # BLD AUTO: 0.64 10*3/MM3 (ref 0–0.4)
EOSINOPHIL NFR BLD AUTO: 7 % (ref 0.3–6.2)
ERYTHROCYTE [DISTWIDTH] IN BLOOD BY AUTOMATED COUNT: 14.4 % (ref 12.3–15.4)
GLUCOSE SERPL-MCNC: 129 MG/DL (ref 65–99)
HCT VFR BLD AUTO: 38.3 % (ref 37.5–51)
HGB BLD-MCNC: 12.7 G/DL (ref 13–17.7)
IMM GRANULOCYTES # BLD AUTO: 0.03 10*3/MM3 (ref 0–0.05)
IMM GRANULOCYTES NFR BLD AUTO: 0.3 % (ref 0–0.5)
INR PPP: 1.5 (ref 0.86–1.15)
LYMPHOCYTES # BLD AUTO: 1.66 10*3/MM3 (ref 0.7–3.1)
LYMPHOCYTES NFR BLD AUTO: 18.1 % (ref 19.6–45.3)
MCH RBC QN AUTO: 28.5 PG (ref 26.6–33)
MCHC RBC AUTO-ENTMCNC: 33.2 G/DL (ref 31.5–35.7)
MCV RBC AUTO: 86.1 FL (ref 79–97)
MONOCYTES # BLD AUTO: 0.54 10*3/MM3 (ref 0.1–0.9)
MONOCYTES NFR BLD AUTO: 5.9 % (ref 5–12)
NEUTROPHILS NFR BLD AUTO: 6.27 10*3/MM3 (ref 1.7–7)
NEUTROPHILS NFR BLD AUTO: 68.2 % (ref 42.7–76)
PLATELET # BLD AUTO: 320 10*3/MM3 (ref 140–450)
PMV BLD AUTO: 8.4 FL (ref 6–12)
POTASSIUM SERPL-SCNC: 5 MMOL/L (ref 3.5–5.2)
PROTHROMBIN TIME: 18.3 SECONDS (ref 11.8–14.9)
PSA SERPL-MCNC: 30.9 NG/ML (ref 0–4)
RBC # BLD AUTO: 4.45 10*6/MM3 (ref 4.14–5.8)
SODIUM SERPL-SCNC: 140 MMOL/L (ref 136–145)
WBC NRBC COR # BLD: 9.19 10*3/MM3 (ref 3.4–10.8)

## 2023-02-20 PROCEDURE — 84153 ASSAY OF PSA TOTAL: CPT

## 2023-02-20 PROCEDURE — 85610 PROTHROMBIN TIME: CPT

## 2023-02-20 PROCEDURE — 80048 BASIC METABOLIC PNL TOTAL CA: CPT

## 2023-02-20 PROCEDURE — 85025 COMPLETE CBC W/AUTO DIFF WBC: CPT

## 2023-02-20 PROCEDURE — 36415 COLL VENOUS BLD VENIPUNCTURE: CPT

## 2023-03-14 ENCOUNTER — TRANSCRIBE ORDERS (OUTPATIENT)
Dept: FAMILY MEDICINE CLINIC | Age: 63
End: 2023-03-14
Payer: MEDICAID

## 2023-03-17 ENCOUNTER — LAB (OUTPATIENT)
Dept: LAB | Facility: HOSPITAL | Age: 63
End: 2023-03-17
Payer: MEDICAID

## 2023-03-17 ENCOUNTER — TRANSCRIBE ORDERS (OUTPATIENT)
Dept: ADMINISTRATIVE | Facility: HOSPITAL | Age: 63
End: 2023-03-17
Payer: MEDICAID

## 2023-03-17 DIAGNOSIS — C61 PROSTATE CANCER: ICD-10-CM

## 2023-03-17 DIAGNOSIS — C61 PROSTATE CANCER: Primary | ICD-10-CM

## 2023-03-17 LAB
PSA SERPL-MCNC: 10.6 NG/ML (ref 0–4)
TESTOST SERPL-MCNC: 475 NG/DL (ref 193–740)

## 2023-03-17 PROCEDURE — 84403 ASSAY OF TOTAL TESTOSTERONE: CPT

## 2023-03-17 PROCEDURE — 36415 COLL VENOUS BLD VENIPUNCTURE: CPT

## 2023-03-17 PROCEDURE — 84153 ASSAY OF PSA TOTAL: CPT

## 2023-03-31 DIAGNOSIS — I82.4Y1 ACUTE DEEP VEIN THROMBOSIS (DVT) OF PROXIMAL VEIN OF RIGHT LOWER EXTREMITY: ICD-10-CM

## 2023-03-31 DIAGNOSIS — E11.42 TYPE 2 DIABETES MELLITUS WITH DIABETIC POLYNEUROPATHY, WITHOUT LONG-TERM CURRENT USE OF INSULIN: ICD-10-CM

## 2023-03-31 RX ORDER — BLOOD-GLUCOSE METER
KIT MISCELLANEOUS
Qty: 100 EACH | Refills: 5 | Status: SHIPPED | OUTPATIENT
Start: 2023-03-31

## 2023-04-12 ENCOUNTER — OFFICE VISIT (OUTPATIENT)
Dept: FAMILY MEDICINE CLINIC | Age: 63
End: 2023-04-12
Payer: MEDICAID

## 2023-04-12 VITALS
WEIGHT: 200 LBS | BODY MASS INDEX: 25.67 KG/M2 | DIASTOLIC BLOOD PRESSURE: 78 MMHG | TEMPERATURE: 97.5 F | OXYGEN SATURATION: 98 % | SYSTOLIC BLOOD PRESSURE: 128 MMHG | HEART RATE: 79 BPM | HEIGHT: 74 IN

## 2023-04-12 DIAGNOSIS — C61 PROSTATE CANCER METASTATIC TO BONE: Primary | ICD-10-CM

## 2023-04-12 DIAGNOSIS — R22.1 NECK MASS: ICD-10-CM

## 2023-04-12 DIAGNOSIS — E11.42 TYPE 2 DIABETES MELLITUS WITH DIABETIC POLYNEUROPATHY, WITHOUT LONG-TERM CURRENT USE OF INSULIN: ICD-10-CM

## 2023-04-12 DIAGNOSIS — F41.8 OTHER SPECIFIED ANXIETY DISORDERS: ICD-10-CM

## 2023-04-12 DIAGNOSIS — C79.51 PROSTATE CANCER METASTATIC TO BONE: Primary | ICD-10-CM

## 2023-04-12 DIAGNOSIS — K21.9 GASTROESOPHAGEAL REFLUX DISEASE WITHOUT ESOPHAGITIS: ICD-10-CM

## 2023-04-12 DIAGNOSIS — F33.1 MAJOR DEPRESSIVE DISORDER, RECURRENT EPISODE, MODERATE DEGREE: ICD-10-CM

## 2023-04-12 DIAGNOSIS — I82.4Y1 ACUTE DEEP VEIN THROMBOSIS (DVT) OF PROXIMAL VEIN OF RIGHT LOWER EXTREMITY: ICD-10-CM

## 2023-04-12 PROBLEM — N50.89 SCROTAL EDEMA: Status: RESOLVED | Noted: 2023-01-27 | Resolved: 2023-04-12

## 2023-04-12 PROBLEM — R97.20 ELEVATED PSA: Status: RESOLVED | Noted: 2023-01-27 | Resolved: 2023-04-12

## 2023-04-12 PROBLEM — R97.20 ELEVATED PROSTATE SPECIFIC ANTIGEN (PSA): Status: RESOLVED | Noted: 2023-01-27 | Resolved: 2023-04-12

## 2023-04-12 PROCEDURE — 1159F MED LIST DOCD IN RCRD: CPT | Performed by: FAMILY MEDICINE

## 2023-04-12 PROCEDURE — 99214 OFFICE O/P EST MOD 30 MIN: CPT | Performed by: FAMILY MEDICINE

## 2023-04-12 PROCEDURE — 3051F HG A1C>EQUAL 7.0%<8.0%: CPT | Performed by: FAMILY MEDICINE

## 2023-04-12 PROCEDURE — 1160F RVW MEDS BY RX/DR IN RCRD: CPT | Performed by: FAMILY MEDICINE

## 2023-04-12 PROCEDURE — 3074F SYST BP LT 130 MM HG: CPT | Performed by: FAMILY MEDICINE

## 2023-04-12 PROCEDURE — 3078F DIAST BP <80 MM HG: CPT | Performed by: FAMILY MEDICINE

## 2023-04-12 RX ORDER — PANTOPRAZOLE SODIUM 40 MG/1
40 TABLET, DELAYED RELEASE ORAL DAILY
Qty: 30 TABLET | Refills: 2 | Status: SHIPPED | OUTPATIENT
Start: 2023-04-12

## 2023-04-12 NOTE — ASSESSMENT & PLAN NOTE
Stable on current regimen.  Symptoms are well controlled.  No adverse effects. He does require ongoing use of this controlled substance to function.  Tox screen was not due today.  Prior tox screen appropriate.  LISSETTE was run today.  Refills were not needed today.  RTC 2 months.

## 2023-04-12 NOTE — ASSESSMENT & PLAN NOTE
"He did go to establish care with [facility] back in Feb.  He goes back to see them April.  He did get a 60 day supply of Casodex but ran out of it on March 30th (he was originally supposed to be seen in the clinic on this date).  He states that he was told \"they'll probably put you on a shot\" so no more meds were sent in.  PSA done back in March so this showed suppression of the PSA to 10.6 from a high of 86.7.  New supraclavicular golf ball-sized mass in the left neck is concerning for metastatic lesion.  Ultrasound ordered.  It is possible that he would agree to biopsy on the site if it were amenable since he refused prostate biopsy due to not wanting to go on antibiotics.  "

## 2023-04-12 NOTE — ASSESSMENT & PLAN NOTE
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  Continue to wear compression stockings, which she has been doing.  We are going to get home health on board and have nursing and Physical Therapy both assess him.  I will be checking labs and we can then consider possibly adding a loop diuretic, although this may be minimally effective due to the nature of the swelling.  He does continue on Xarelto for his malignancy-induced DVT.

## 2023-04-12 NOTE — Clinical Note
Lawrence Chandler,  I don't know if you can help this poor gentleman or not.  He has prostate cancer with diffuse metastasis including mets to bone and malignancy-induced DVT RLE.  He has been really resistant to pursuing care for the cancer, but he is likewise adamant that he doesn't want Hospice.  There seems to be a significant denial element.  He lives alone.  We finally got home health in, and she says it's a mess.  He has no bed, he can't cook himself meals, and she doesn't think he can safely drive with the shape that the leg is in.  I'm pulling my hair out about how to get this fellow some help.  If you can think of any services he might qualify for (meals? transportation? home care services?), I would greatly appreciate it.  I know you're not a miracle worker and he has to agree to let us help him at some point, but if there's anything we can for this pt, I want to try.  Thanks so much, TIP

## 2023-04-12 NOTE — PROGRESS NOTES
"Chief Complaint  Prostate Cancer     Subjective          Cleve Ibarra presents to [facility] today to f/u on metastatic prostate cancer.    Previous HPI:  We have had multiple discussions that Cleve has prostate cancer with his last PSA elevated 87 and lesions consistent with diffuse metastasis to bone throughout the body seen incidentally on CT AP done at [facility].  He has complained of right hip pain as well and x-ray demonstrated sclerotic lesions suspected to be metastatic disease.  He has been set up with Dr. [facility] but did not stay to be seen by her at that appointment.  He was then set up follow-up with Dr. [facility].  Dr. Lauren referred him to Dr. Danielle for biopsy and further treatment.  He was scheduled to go for biopsy with Dr. Danielle on 2/8/2023 but did not go to that appointment.  He has been very frustrated and feels that \"they don't need to do that biopsy anyway.  They already know I have cancer.\"      4/12/23:  He did go to establish care with [facility] back in Feb.  He goes back to see them April.  He did get a 60 day supply of Casodex but ran out of it on March 30th (he was originally supposed to be seen in the clinic on this date).  He states that he was told \"they'll probably put you on a shot\" so no more meds were sent in.  PSA done back in March so this showed suppression of the PSA to 10.6 from a high of 86.7.      He noticed a \"knot\" come up on the L side of the neck about 2-3 weeks ago.    He having a lot of pain and burning in the R leg.  +Swelling in the RLE.  +Weakness and pain.  He is wearing compression stockings.  He is on Xarelto for malignancy-induced DVT of the right leg.    He is on metformin for diabetes.  A1c was 7.3 back in January.  He is on an ACE I but no statin.  He is due for foot exam.  He is due for eye exam.    I have placed him on clonazepam 1 tab daily to help with anxiety and depression in the hopes that this " "would enable him to participate more fully in decision making for his prostate cancer.  It seems to be working well for him.  No complaints of anxiety today.    He is on lisinopril, amlodipine and carvedilol for hypertension.  Blood pressure has been well controlled.  No chest pain, palpitations or shortness of breath.    He has had some nausea for which she has Zofran and Phenergan.  Was using omeprazole for GERD but stopped because it \"stopped working.\"      Current Outpatient Medications:   •  amLODIPine (NORVASC) 5 MG tablet, Take 1 tablet by mouth Daily., Disp: 30 tablet, Rfl: 5  •  carvedilol (COREG) 3.125 MG tablet, Take 1 tablet by mouth 2 Times a Day., Disp: 60 tablet, Rfl: 5  •  clonazePAM (KlonoPIN) 0.5 MG tablet, Take 1 tablet by mouth Daily As Needed for Anxiety., Disp: 30 tablet, Rfl: 1  •  FREESTYLE LITE test strip, Test once daily as directed, Disp: 100 each, Rfl: 5  •  lisinopril (PRINIVIL,ZESTRIL) 20 MG tablet, Take 1 tablet by mouth 2 (Two) Times a Day. (Patient taking differently: Take 2 tablets by mouth 2 (Two) Times a Day.), Disp: 180 tablet, Rfl: 1  •  metFORMIN (GLUCOPHAGE) 1000 MG tablet, Take 1 tablet by mouth 2 (Two) Times a Day With Meals., Disp: , Rfl:   •  ondansetron ODT (ZOFRAN-ODT) 8 MG disintegrating tablet, Dissolve 1 tablet on the tongue Every 8 (Eight) Hours As Needed for Nausea or Vomiting., Disp: 45 tablet, Rfl: 0  •  promethazine (PHENERGAN) 25 MG suppository, Insert 1 suppository into the rectum Every 6 (Six) Hours As Needed for Nausea or Vomiting., Disp: 60 suppository, Rfl: 0  •  rivaroxaban (Xarelto) 20 MG tablet, Take 1 tablet by mouth Daily., Disp: 30 tablet, Rfl: 5  •  pantoprazole (PROTONIX) 40 MG EC tablet, Take 1 tablet by mouth Daily., Disp: 30 tablet, Rfl: 2    Allergies:  Clonidine hcl, Fluoxetine, Sertraline, and Venlafaxine      Objective   Vital Signs:   Vitals:    04/12/23 0816   BP: 128/78   BP Location: Right arm   Patient Position: Sitting   Cuff Size: " "Adult   Pulse: 79   Temp: 97.5 °F (36.4 °C)   TempSrc: Oral   SpO2: 98%   Weight: 90.7 kg (200 lb)   Height: 188 cm (74.02\")       Physical Exam  Constitutional:       Appearance: Normal appearance.   HENT:      Head: Normocephalic and atraumatic.   Eyes:      Extraocular Movements: Extraocular movements intact.      Conjunctiva/sclera: Conjunctivae normal.   Neck:      Comments: Firm, nontender, fixed golf ball sized mass, left neck, supraclavicular  Pulmonary:      Effort: Pulmonary effort is normal. No respiratory distress.   Musculoskeletal:         General: Normal range of motion.   Skin:     General: Skin is warm and dry.   Neurological:      General: No focal deficit present.      Mental Status: He is alert and oriented to person, place, and time.   Psychiatric:         Mood and Affect: Mood normal.         Behavior: Behavior normal.         Thought Content: Thought content normal.         Judgment: Judgment normal.          Lab Results   Component Value Date    GLUCOSE 129 (H) 02/20/2023    BUN 23 02/20/2023    CREATININE 1.50 (H) 02/20/2023    BCR 15.3 02/20/2023    K 5.0 02/20/2023    CO2 24.0 02/20/2023    CALCIUM 9.7 02/20/2023    ALBUMIN 4.6 01/03/2023    LABIL2 1.6 05/18/2021    AST 13 01/03/2023    ALT 8 01/03/2023       Lab Results   Component Value Date    CHOL 214 (H) 09/28/2022    CHLPL 205 (H) 05/18/2021    TRIG 227 (H) 09/28/2022    HDL 34 (L) 09/28/2022     (H) 09/28/2022            Assessment and Plan    Diagnoses and all orders for this visit:    1. Prostate cancer metastatic to bone (Primary)  Assessment & Plan:  He did go to establish care with [facility] back in Feb.  He goes back to see them April.  He did get a 60 day supply of Casodex but ran out of it on March 30th (he was originally supposed to be seen in the clinic on this date).  He states that he was told \"they'll probably put you on a shot\" so no more meds were sent in.  PSA done back in March so this showed suppression " of the PSA to 10.6 from a high of 86.7.  New supraclavicular golf ball-sized mass in the left neck is concerning for metastatic lesion.  Ultrasound ordered.  It is possible that he would agree to biopsy on the site if it were amenable since he refused prostate biopsy due to not wanting to go on antibiotics.    Orders:  -     Cancel: US Head Neck Soft Tissue; Future  -     US Head Neck Soft Tissue; Future  -     Ambulatory Referral to Home Health    2. Type 2 diabetes mellitus with diabetic polyneuropathy, without long-term current use of insulin ([facility])  Assessment & Plan:  He is on metformin for diabetes.  A1c was 7.3 back in January.  He is on an ACE I but no statin.  He is due for foot exam; deferred as he did not want to take off his compression stockings today.  He is due for eye exam; he wants to set this up for himself.  Checking labs.    Orders:  -     Comprehensive Metabolic Panel; Future  -     Lipid Panel; Future  -     Hemoglobin A1c; Future  -     Microalbumin / Creatinine Urine Ratio - Urine, Clean Catch; Future  -     Ambulatory Referral to Home Health    3. Neck mass  -     Cancel: US Head Neck Soft Tissue; Future  -     US Head Neck Soft Tissue; Future  -     Ambulatory Referral to Home Health    4. Major depressive disorder, recurrent episode, moderate degree  Assessment & Plan:  Stable on current regimen.  Symptoms are well controlled.  No adverse effects. He does require ongoing use of this controlled substance to function.  Tox screen was not due today.  Prior tox screen appropriate.  LISSETTE was run today.  Refills were not needed today.  RTC 2 months.      Orders:  -     Ambulatory Referral to Home Health    5. Other specified anxiety disorders  Assessment & Plan:  As per depression plan.    Orders:  -     Ambulatory Referral to Home Health    6. Gastroesophageal reflux disease without esophagitis  Assessment & Plan:  He stopped getting relief from omeprazole, so he discontinued that.  We will  try some Protonix instead.  He has used Tagamet in the past so could consider adding that in if Protonix is not strong enough on its own.    Orders:  -     pantoprazole (PROTONIX) 40 MG EC tablet; Take 1 tablet by mouth Daily.  Dispense: 30 tablet; Refill: 2  -     Ambulatory Referral to Home Health    7. Acute deep vein thrombosis (DVT) of proximal vein of right lower extremity  Assessment & Plan:  Right lower extremity DVT with post thrombotic syndrome.  Continue to wear compression stockings, which she has been doing.  We are going to get home health on board and have nursing and Physical Therapy both assess him.  I will be checking labs and we can then consider possibly adding a loop diuretic, although this may be minimally effective due to the nature of the swelling.  He does continue on Xarelto for his malignancy-induced DVT.    Orders:  -     Ambulatory Referral to Home Health      Follow Up   Return in about 2 months (around 6/12/2023) for Recheck.  Patient was given instructions and counseling regarding his condition or for health maintenance advice. Please see specific information pulled into the AVS if appropriate.           01/19/2023

## 2023-04-12 NOTE — ASSESSMENT & PLAN NOTE
He stopped getting relief from omeprazole, so he discontinued that.  We will try some Protonix instead.  He has used Tagamet in the past so could consider adding that in if Protonix is not strong enough on its own.

## 2023-04-13 NOTE — TELEPHONE ENCOUNTER
Caller: FABRICIO    Relationship: Replaced by [facility]    Best call back number: [phone]    What is the best time to reach you: ANYTIME    Who are you requesting to speak with (clinical staff, provider,  specific staff member): CLINICAL      What was the call regarding: FABRICIO FROM [facility] WOULD LIKE A CALL BACK FROM THE NURSE TO DISCUSS PATIENT ADMISSION.     Do you require a callback: YES

## 2023-04-13 NOTE — TELEPHONE ENCOUNTER
Dipti w/ [facility] called stating she went to do pt admission today, states pt is in excruciating pain, states he has been taking tylenol, but he can barely move his leg, states it took him every bit of 20 minutes to stand up, wondering if there is anything we can give pt for pain. Also stated pt hasn't eaten in a week due to severe nausea, pt told her the zofran and phenergan don't work, Dipti wondering if pt would benefit from scopolamine patches for nausea, pt states he is not taking Amlodipine or clonazepam, pt has not had a bath or been off his couch other than to come in here for a week, had the compression stockings on for the last week, Dipti did take them off, but could not get them back on, states she is going to see him twice a week, and is going to start wrapping her legs with something else, states she may do suni boot, states she debated sending pt back to ED, but wanted to try us first before sending him.

## 2023-04-13 NOTE — TELEPHONE ENCOUNTER
Thank you for the update.  Cleve did not share most of that with me yesterday at his visit.  For the nausea, we can certainly try some scopolamine patches.  I will send those in for him.  He told me about a heaviness in the leg yesterday but did not mention that he was in pain.  Given his diffuse metastasis with multiple bone lesions, I think it would be reasonable to send him in hydrocodone/APAP.  However, I do think that he really needs to reconsider going on Hospice, because it is apparent from this report that his symptoms are getting significantly worse and he is really no closer to treatment of his metastatic prostate cancer then he was 3 months ago.  His pain and his nausea will continue to get worse.  He may also continue to develop blood clots despite the Xarelto, which will cause him more problems with the swelling.  I think an Unna boot would be a good idea if he is willing to consider that.  If Dipti can get him to go to the ED, that is fine with me.  He has been pretty resistant to treatment suggestions so far, particularly anything relating to his cancer.  If he were to go to the hospital, possibly they would be able to admit him to an inpatient hospice service, and maybe he would find that more palatable.  I will go ahead and send in the scopolamine patches and a few days worth of hydrocodone so that he can at least give it a try and have some time to make a considered decision.  Please let me know if Dpiti has any other thoughts.

## 2023-04-14 ENCOUNTER — LAB (OUTPATIENT)
Dept: LAB | Facility: HOSPITAL | Age: 63
End: 2023-04-14
Payer: MEDICAID

## 2023-04-14 ENCOUNTER — HOSPITAL ENCOUNTER (OUTPATIENT)
Dept: ULTRASOUND IMAGING | Facility: HOSPITAL | Age: 63
Discharge: HOME | End: 2023-04-14
Payer: MEDICAID

## 2023-04-14 DIAGNOSIS — E11.42 TYPE 2 DIABETES MELLITUS WITH DIABETIC POLYNEUROPATHY, WITHOUT LONG-TERM CURRENT USE OF INSULIN: ICD-10-CM

## 2023-04-14 DIAGNOSIS — C79.51 PROSTATE CANCER METASTATIC TO BONE: ICD-10-CM

## 2023-04-14 DIAGNOSIS — C61 PROSTATE CANCER METASTATIC TO BONE: ICD-10-CM

## 2023-04-14 DIAGNOSIS — R22.1 NECK MASS: ICD-10-CM

## 2023-04-14 LAB
ALBUMIN SERPL-MCNC: 3.4 G/DL (ref 3.5–5.2)
ALBUMIN/GLOB SERPL: 1.4 G/DL
ALP SERPL-CCNC: 180 U/L (ref 39–117)
ALT SERPL W P-5'-P-CCNC: 16 U/L (ref 1–41)
ANION GAP SERPL CALCULATED.3IONS-SCNC: 14.9 MMOL/L (ref 5–15)
AST SERPL-CCNC: 15 U/L (ref 1–40)
BILIRUB SERPL-MCNC: 0.6 MG/DL (ref 0–1.2)
BUN SERPL-MCNC: 52 MG/DL (ref 8–23)
BUN/CREAT SERPL: 21 (ref 7–25)
CALCIUM SPEC-SCNC: 9 MG/DL (ref 8.6–10.5)
CHLORIDE SERPL-SCNC: 96 MMOL/L (ref 98–107)
CHOLEST SERPL-MCNC: 134 MG/DL (ref 0–200)
CO2 SERPL-SCNC: 20.1 MMOL/L (ref 22–29)
CREAT SERPL-MCNC: 2.48 MG/DL (ref 0.76–1.27)
EGFRCR SERPLBLD CKD-EPI 2021: 28.4 ML/MIN/1.73
GLOBULIN UR ELPH-MCNC: 2.5 GM/DL
GLUCOSE SERPL-MCNC: 227 MG/DL (ref 65–99)
HBA1C MFR BLD: 6.9 % (ref 4.8–5.6)
HDLC SERPL-MCNC: 20 MG/DL (ref 40–60)
LDLC SERPL CALC-MCNC: 86 MG/DL (ref 0–100)
LDLC/HDLC SERPL: 4.14 {RATIO}
POTASSIUM SERPL-SCNC: 3.7 MMOL/L (ref 3.5–5.2)
PROT SERPL-MCNC: 5.9 G/DL (ref 6–8.5)
SODIUM SERPL-SCNC: 131 MMOL/L (ref 136–145)
TRIGL SERPL-MCNC: 156 MG/DL (ref 0–150)
VLDLC SERPL-MCNC: 28 MG/DL (ref 5–40)

## 2023-04-14 PROCEDURE — 80053 COMPREHEN METABOLIC PANEL: CPT

## 2023-04-14 PROCEDURE — 80061 LIPID PANEL: CPT

## 2023-04-14 PROCEDURE — 83036 HEMOGLOBIN GLYCOSYLATED A1C: CPT

## 2023-04-14 PROCEDURE — 36415 COLL VENOUS BLD VENIPUNCTURE: CPT

## 2023-04-14 PROCEDURE — 76536 US EXAM OF HEAD AND NECK: CPT

## 2023-04-14 RX ORDER — TRAMADOL HYDROCHLORIDE 50 MG/1
50-100 TABLET ORAL EVERY 8 HOURS PRN
Qty: 30 TABLET | Refills: 0 | Status: SHIPPED | OUTPATIENT
Start: 2023-04-14

## 2023-04-17 ENCOUNTER — REFERRAL TRIAGE (OUTPATIENT)
Dept: CASE MANAGEMENT | Facility: OTHER | Age: 63
End: 2023-04-17
Payer: MEDICAID

## 2023-04-18 ENCOUNTER — PATIENT OUTREACH (OUTPATIENT)
Dept: CASE MANAGEMENT | Facility: OTHER | Age: 63
End: 2023-04-18
Payer: MEDICAID

## 2023-04-18 NOTE — OUTREACH NOTE
Social Work Assessment  Questions/Answers    Flowsheet Row Most Recent Value   Referral Source physician   Reason for Consult community resources, transportation   People in Home alone   Current Living Arrangements apartment   Primary Care Provided by self   Provides Primary Care For no one, unable/limited ability to care for self   Family Caregiver if Needed none   Quality of Family Relationships non-existent   Source of Income disability   Application for Public Assistance obtained public assistance pending number        SDOH updated and reviewed with the patient during this program:  Financial Resource Strain: Medium Risk   • Difficulty of Paying Living Expenses: Somewhat hard      Food Insecurity: Food Insecurity Present   • Worried About Running Out of Food in the Last Year: Sometimes true   • Ran Out of Food in the Last Year: Sometimes true      Transportation Needs: Unmet Transportation Needs   • Lack of Transportation (Medical): Yes   • Lack of Transportation (Non-Medical): Yes      Housing Stability: Low Risk    • Unable to Pay for Housing in the Last Year: No   • Number of Places Lived in the Last Year: 1   • Unstable Housing in the Last Year: No     Patient Outreach    MSW spoke with patient to discuss resources needed. Patient states 'I need help with just about everything right now'. MSW spoke with patient about meal delivery services, in home caregivers, and transportation. MSW sent referral to Farnaz at [facility] for meal delivery and homemaker services. MSW also provided patient with [facility] information for future rides to appointments/treatment. MSW available and will follow up with patient.    Geraldine KOHLER -   Ambulatory Case Management    4/18/2023, 14:35 EDT

## 2023-04-19 ENCOUNTER — PATIENT OUTREACH (OUTPATIENT)
Dept: CASE MANAGEMENT | Facility: OTHER | Age: 63
End: 2023-04-19
Payer: MEDICAID

## 2023-04-19 NOTE — LETTER
2023      [facility]  [address] [facility] [address]  [place] [address]  (P): [phone]  (F): [phone]      RE: Cleve Ibarra  : 1960  [address]      [place] [address]      To Whom It May Concern,      I am writing this letter on behalf of Cleve Ibarra. Mr. Ibarra is being treated on an outpatient basis at [facility] for the following medical concerns: prostate cancer metastatic to bone, pain in both feet, right hip pain, type II diabetes, depression/anxiety, and acute deep vein thrombosis (DVT) of proximal vein of right lower extremity. Mr. Ibarra has a recent diagnosis of prostate cancer and is being referred to multiple specialty appointments for treatment and intervention. Mr. Ibarra has many health concerns that cause low stamina and pain recently which limit his ability to drive to his appointments any longer. Mr. Ibarra is also prescribed medication that alters his mental status and when taken he should not operate a motor vehicle. Mr. Ibarra has no family support or any other options for assistance with transportation to medical appointments. It is imperative that Mr. Ibarra is able to attend these scheduled ongoing appointments for further testing and follow up. Please let me know if you have any other questions or need additional information.      Sincerely,          Doretha Cornelius MD

## 2023-04-19 NOTE — OUTREACH NOTE
Care Coordination    MSW assisted with PCP a letter for Transportation Delivery Cabinet if patient is denied for having vehicle in his name. MSW informed Jaye to print and obtain PCP signature and then upload into chart. MSW to continue to f/u.    Geraldine KOHLER -   Ambulatory Case Management    4/19/2023, 12:42 EDT

## 2023-04-20 ENCOUNTER — TELEPHONE (OUTPATIENT)
Dept: FAMILY MEDICINE CLINIC | Age: 63
End: 2023-04-20
Payer: MEDICAID

## 2023-04-20 ENCOUNTER — PATIENT OUTREACH (OUTPATIENT)
Dept: CASE MANAGEMENT | Facility: OTHER | Age: 63
End: 2023-04-20
Payer: MEDICAID

## 2023-04-20 NOTE — TELEPHONE ENCOUNTER
Dipti w/ [facility] called stating pt has started to eat a little bit, but pt has refused hospice and refuses to go to the hospital, pt also did not go to his urology appointment, states he got sick on the side of the road on the way and turned around and went back home. Did say his pain was better with the tramadol and said his leg looked much better today after Dipti wrapped him w/ the suni boot on Monday, states she is going to continue to do that on Mondays and Thursdays. But pt has been able to eat a little bit the last couple days.

## 2023-04-20 NOTE — TELEPHONE ENCOUNTER
Unfortunately, the only urologist closer than [place] is going to be Dr. Danielle, whom Cleve has previously seen.  I do not know if there is any utility in going back to Dr. Danielle unless Cleve changes his mind about allowing a biopsy to be done.  However, it is possible that if the new mass on his neck turns out to be the cancer as is highly likely, it may provide us an alternative site to biopsy, since Cleve's main concern about undergoing prostate biopsy was that he did not want to take the preop ciprofloxacin.  In order to know whether this option would be available to us, we will need to proceed with the CT of the soft tissues of the neck as recommended by the radiologist who read his ultrasound.  Since transportation is an issue for him right now, we would hopefully be able to utilize [employer] as per Geraldine Myers's plan.  The letter for [employer] is in my outbox currently and ready to be sent out.  Let's try to expedite that approval so that we can get Cleve set up for CT neck in order to get him back in with Urology if we are able to get a tissue diagnosis.  Thanks, TIP

## 2023-04-20 NOTE — OUTREACH NOTE
Care Coordination    MSW sent referral to Poly at [facility] for patient needing assistance with meals on wheels, personal care, and homemaker services. Poly states that she will call patient to complete their screening.    Geraldine KOHLER -   Ambulatory Case Management    4/20/2023, 09:12 EDT

## 2023-04-20 NOTE — TELEPHONE ENCOUNTER
Dipti inf, states she will talk to Cleve and call us back if he wants to move forward with CT or Dr Danielle appointment.

## 2023-04-21 ENCOUNTER — PATIENT OUTREACH (OUTPATIENT)
Dept: CASE MANAGEMENT | Facility: OTHER | Age: 63
End: 2023-04-21
Payer: MEDICAID

## 2023-04-21 NOTE — OUTREACH NOTE
Care Coordination    MSW sent signed letter from PCP to transportation delivery cabinet and will follow up with patient next week to ensure he has transportation scheduled.    Geraldine KOHLER -   Ambulatory Case Management    4/21/2023, 10:36 EDT

## 2023-04-24 ENCOUNTER — TELEPHONE (OUTPATIENT)
Dept: FAMILY MEDICINE CLINIC | Age: 63
End: 2023-04-24
Payer: MEDICAID

## 2023-04-24 NOTE — TELEPHONE ENCOUNTER
Dipti from [facility] called stating pt is wanting to go ahead with Hospice.  She is going to go ahead and call and get them to come out.

## 2023-04-25 ENCOUNTER — OUTSIDE FACILITY SERVICE (OUTPATIENT)
Dept: FAMILY MEDICINE CLINIC | Age: 63
End: 2023-04-25
Payer: MEDICAID

## 2023-04-25 ENCOUNTER — PATIENT OUTREACH (OUTPATIENT)
Dept: CASE MANAGEMENT | Facility: OTHER | Age: 63
End: 2023-04-25
Payer: MEDICAID

## 2023-04-25 ENCOUNTER — TELEPHONE (OUTPATIENT)
Dept: FAMILY MEDICINE CLINIC | Age: 63
End: 2023-04-25
Payer: MEDICAID

## 2023-04-25 DIAGNOSIS — C79.51 PROSTATE CANCER METASTATIC TO BONE: Primary | ICD-10-CM

## 2023-04-25 DIAGNOSIS — C61 PROSTATE CANCER METASTATIC TO BONE: Primary | ICD-10-CM

## 2023-04-25 RX ORDER — MORPHINE SULFATE 100 MG/5ML
5 SOLUTION ORAL EVERY 4 HOURS PRN
Qty: 30 ML | Refills: 0 | Status: SHIPPED | OUTPATIENT
Start: 2023-04-25

## 2023-04-25 RX ORDER — HALOPERIDOL 2 MG/ML
1 SOLUTION ORAL EVERY 4 HOURS PRN
Qty: 30 ML | Refills: 0 | Status: SHIPPED | OUTPATIENT
Start: 2023-04-25

## 2023-04-25 RX ORDER — LORAZEPAM 2 MG/ML
1 CONCENTRATE ORAL EVERY 4 HOURS PRN
Qty: 30 ML | Refills: 0 | Status: SHIPPED | OUTPATIENT
Start: 2023-04-25

## 2023-04-25 NOTE — TELEPHONE ENCOUNTER
Given the overall situation, I think that is for the best.  Please let me know if there is anything I can do to help.  Thanks, TIP

## 2023-04-25 NOTE — TELEPHONE ENCOUNTER
Admited to Hospice  Prostate CA w mets  Appetite less than 5%  New neck mass has appeared- declined work-up    mas

## 2023-04-25 NOTE — OUTREACH NOTE
Care Coordination    MSW spoke with Farnaz at [facility] and she attempted to reach patient and was going to call again today.    Geraldine KOHLER -   Ambulatory Case Management    4/25/2023, 12:41 EDT

## 2023-04-26 ENCOUNTER — PATIENT OUTREACH (OUTPATIENT)
Dept: CASE MANAGEMENT | Facility: OTHER | Age: 63
End: 2023-04-26
Payer: MEDICAID

## 2023-04-26 NOTE — OUTREACH NOTE
Care Coordination    Per chart review, patient is now in Hospice care. MSW to discharge with goals met and transition to higher level of care. Patient has transportation set up and also [facility] services assessed per Poly with [employer].     Geraldine KOHLER -   Ambulatory Case Management    4/26/2023, 09:22 EDT

## 2023-04-27 DIAGNOSIS — C61 PROSTATE CANCER METASTATIC TO BONE: Primary | ICD-10-CM

## 2023-04-27 DIAGNOSIS — C79.51 PROSTATE CANCER METASTATIC TO BONE: Primary | ICD-10-CM

## 2023-04-27 RX ORDER — FUROSEMIDE 20 MG/1
20 TABLET ORAL EVERY MORNING
Qty: 14 TABLET | Refills: 0 | Status: SHIPPED | OUTPATIENT
Start: 2023-04-27

## 2023-04-27 RX ORDER — PREDNISONE 20 MG/1
20 TABLET ORAL DAILY
Qty: 14 TABLET | Refills: 0 | Status: SHIPPED | OUTPATIENT
Start: 2023-04-27

## 2023-04-27 RX ORDER — IPRATROPIUM BROMIDE AND ALBUTEROL SULFATE 2.5; .5 MG/3ML; MG/3ML
3 SOLUTION RESPIRATORY (INHALATION) EVERY 4 HOURS PRN
Qty: 360 ML | Refills: 0 | Status: SHIPPED | OUTPATIENT
Start: 2023-04-27

## 2023-05-01 ENCOUNTER — OUTSIDE FACILITY SERVICE (OUTPATIENT)
Dept: FAMILY MEDICINE CLINIC | Age: 63
End: 2023-05-01
Payer: MEDICAID

## 2023-05-09 DIAGNOSIS — C61 PROSTATE CANCER METASTATIC TO BONE: ICD-10-CM

## 2023-05-09 DIAGNOSIS — C79.51 PROSTATE CANCER METASTATIC TO BONE: ICD-10-CM

## 2023-05-09 RX ORDER — MORPHINE SULFATE 100 MG/5ML
SOLUTION ORAL
Qty: 30 ML | Refills: 0 | Status: SHIPPED | OUTPATIENT
Start: 2023-05-09

## 2023-05-20 DIAGNOSIS — C61 PROSTATE CANCER METASTATIC TO BONE: ICD-10-CM

## 2023-05-20 DIAGNOSIS — C79.51 PROSTATE CANCER METASTATIC TO BONE: ICD-10-CM

## 2023-05-20 RX ORDER — HALOPERIDOL 2 MG/ML
1 SOLUTION ORAL EVERY 4 HOURS PRN
Qty: 30 ML | Refills: 0 | Status: SHIPPED | OUTPATIENT
Start: 2023-05-20

## 2023-05-20 RX ORDER — LORAZEPAM 2 MG/ML
1 CONCENTRATE ORAL EVERY 4 HOURS PRN
Qty: 30 ML | Refills: 0 | Status: SHIPPED | OUTPATIENT
Start: 2023-05-20

## 2023-05-25 DIAGNOSIS — C61 PROSTATE CANCER METASTATIC TO BONE: ICD-10-CM

## 2023-05-25 DIAGNOSIS — C79.51 PROSTATE CANCER METASTATIC TO BONE: ICD-10-CM

## 2023-05-25 RX ORDER — PREDNISONE 20 MG/1
20 TABLET ORAL DAILY
Qty: 14 TABLET | Refills: 0 | Status: SHIPPED | OUTPATIENT
Start: 2023-05-25

## 2023-05-25 RX ORDER — MORPHINE SULFATE 100 MG/5ML
SOLUTION ORAL
Qty: 30 ML | Refills: 0 | Status: SHIPPED | OUTPATIENT
Start: 2023-05-25

## 2023-05-25 RX ORDER — FUROSEMIDE 20 MG/1
20 TABLET ORAL EVERY MORNING
Qty: 14 TABLET | Refills: 0 | Status: SHIPPED | OUTPATIENT
Start: 2023-05-25

## 2023-06-01 NOTE — ASSESSMENT & PLAN NOTE
-- DO NOT REPLY / DO NOT REPLY ALL --  -- Message is from Engagement Center Operations (ECO) --    Order Request  Ultrasound of  Breast    Message / reason: Yearly     Insurance type:   Payor: BLUE CROSS BLUE SHIELD [place] / Plan: BLUE CHOICE OPT [id number] / Product Type: PPO MISC    Preferred Delivery Method   Input in Epic     Caller Information       Type Contact Phone/Fax    06/01/2023 05:29 PM CDT Phone (Incoming) Álvaro Dumont (Self) [phone] (H)          Alternative phone number:  [phone]    Can a detailed message be left? Yes    Message Turnaround:     [place]:    Please give this turnaround time to the caller:   \"This message will be sent to [state Provider's name]. The clinical team will fulfill your request as soon as they review your message.\"               Has not tolerated statins in the past.

## 2023-06-06 ENCOUNTER — TELEPHONE (OUTPATIENT)
Dept: FAMILY MEDICINE CLINIC | Age: 63
End: 2023-06-06
Payer: MEDICAID

## 2023-06-14 DIAGNOSIS — C61 PROSTATE CANCER METASTATIC TO BONE: ICD-10-CM

## 2023-06-14 DIAGNOSIS — C79.51 PROSTATE CANCER METASTATIC TO BONE: ICD-10-CM

## 2023-06-14 RX ORDER — DULOXETIN HYDROCHLORIDE 60 MG/1
60 CAPSULE, DELAYED RELEASE ORAL DAILY
Qty: 14 CAPSULE | Refills: 1 | Status: SHIPPED | OUTPATIENT
Start: 2023-06-14

## 2023-06-14 RX ORDER — MORPHINE SULFATE 100 MG/5ML
SOLUTION ORAL
Qty: 30 ML | Refills: 0 | Status: SHIPPED | OUTPATIENT
Start: 2023-06-14

## 2023-08-14 NOTE — TELEPHONE ENCOUNTER
Caller: Cleve Ibarra    Relationship: Self    Best call back number: [phone]    Requested Prescriptions:   Requested Prescriptions     Pending Prescriptions Disp Refills   • rivaroxaban (Xarelto) 20 MG tablet 30 tablet 5     Sig: Take 1 tablet by mouth Daily.   • FREESTYLE LITE test strip 100 each 5     Sig: Test once daily        Pharmacy where request should be sent: [facility]     Last office visit with prescribing clinician: 2/17/2023   Last telemedicine visit with prescribing clinician: 4/12/2023   Next office visit with prescribing clinician: 4/12/2023     Does the patient have less than a 3 day supply:  [x] Yes  [] No    Would you like a call back once the refill request has been completed: [] Yes [] No    If the office needs to give you a call back, can they leave a voicemail: [] Yes [] No    Jerrod Hernandez Rep   03/31/23 10:58 EDT            [employer] Counseling:  I discussed with the patient the risks of mycophenolate mofetil including but not limited to infection/immunosuppression, GI upset, hypokalemia, hypercholesterolemia, bone marrow suppression, lymphoproliferative disorders, malignancy, GI ulceration/bleed/perforation, colitis, interstitial lung disease, kidney failure, progressive multifocal leukoencephalopathy, and birth defects.  The patient understands that monitoring is required including a baseline creatinine and regular CBC testing. In addition, patient must alert us immediately if symptoms of infection or other concerning signs are noted.